# Patient Record
Sex: FEMALE | Race: WHITE | NOT HISPANIC OR LATINO | Employment: UNEMPLOYED | URBAN - METROPOLITAN AREA
[De-identification: names, ages, dates, MRNs, and addresses within clinical notes are randomized per-mention and may not be internally consistent; named-entity substitution may affect disease eponyms.]

---

## 2021-01-14 DIAGNOSIS — Z23 NEED FOR VACCINATION: ICD-10-CM

## 2022-12-22 ENCOUNTER — OFFICE VISIT (OUTPATIENT)
Dept: MEDICAL GROUP | Facility: MEDICAL CENTER | Age: 77
End: 2022-12-22
Payer: MEDICARE

## 2022-12-22 VITALS
OXYGEN SATURATION: 93 % | SYSTOLIC BLOOD PRESSURE: 116 MMHG | TEMPERATURE: 96.9 F | DIASTOLIC BLOOD PRESSURE: 68 MMHG | HEART RATE: 88 BPM

## 2022-12-22 DIAGNOSIS — F17.200 CURRENT SMOKER: ICD-10-CM

## 2022-12-22 DIAGNOSIS — Z23 NEED FOR VACCINATION: ICD-10-CM

## 2022-12-22 DIAGNOSIS — F33.9 MAJOR DEPRESSION, RECURRENT, CHRONIC (HCC): ICD-10-CM

## 2022-12-22 PROBLEM — M85.80 OSTEOPENIA: Status: ACTIVE | Noted: 2022-10-15

## 2022-12-22 PROCEDURE — G0008 ADMIN INFLUENZA VIRUS VAC: HCPCS | Performed by: FAMILY MEDICINE

## 2022-12-22 PROCEDURE — 90662 IIV NO PRSV INCREASED AG IM: CPT | Performed by: FAMILY MEDICINE

## 2022-12-22 PROCEDURE — 99204 OFFICE O/P NEW MOD 45 MIN: CPT | Mod: 25 | Performed by: FAMILY MEDICINE

## 2022-12-22 RX ORDER — ZIPRASIDONE HYDROCHLORIDE 60 MG/1
CAPSULE ORAL
COMMUNITY
Start: 2022-09-28 | End: 2022-12-22

## 2022-12-22 RX ORDER — SERTRALINE HYDROCHLORIDE 25 MG/1
TABLET, FILM COATED ORAL
Qty: 42 TABLET | Refills: 0 | Status: SHIPPED | OUTPATIENT
Start: 2022-12-22 | End: 2022-12-22

## 2022-12-22 RX ORDER — SERTRALINE HYDROCHLORIDE 25 MG/1
TABLET, FILM COATED ORAL
Qty: 42 TABLET | Refills: 0 | Status: SHIPPED | OUTPATIENT
Start: 2022-12-22 | End: 2023-01-19

## 2022-12-22 ASSESSMENT — ENCOUNTER SYMPTOMS
FEVER: 0
ABDOMINAL PAIN: 0
MYALGIAS: 0
COUGH: 0
SHORTNESS OF BREATH: 0
DIZZINESS: 0
PALPITATIONS: 0
WEIGHT LOSS: 0
DEPRESSION: 0
WEAKNESS: 0
CHILLS: 0

## 2022-12-22 ASSESSMENT — PATIENT HEALTH QUESTIONNAIRE - PHQ9
5. POOR APPETITE OR OVEREATING: 0 - NOT AT ALL
SUM OF ALL RESPONSES TO PHQ QUESTIONS 1-9: 5
CLINICAL INTERPRETATION OF PHQ2 SCORE: 3

## 2022-12-22 NOTE — ASSESSMENT & PLAN NOTE
Chronic, unstable.  During the exam patient seemed very subdued, and minimal talking.  Discussed having her off the Geodon as this would not be a first choice medication for depression.  Consider getting her started on Zoloft and replacement of the acute on.  Discussed the importance of slow tapering off of the gait on we will slowly increasing the Zoloft.  Reduced Geodon down to 60 mg daily for the next 4 weeks, initiate Zoloft 25 mg daily for 2 weeks and increase to 50 mg for an additional 2 weeks.  Patient is to follow-up in 4 weeks to see how reduction of the Geodon and increasing the Zoloft is going for her.

## 2022-12-22 NOTE — PROGRESS NOTES
Jess Diallo is a pleasant 77 y.o. female here to establish care.    HPI:   Problem   Osteopenia   Major Depression, Recurrent, Chronic (Hcc)    She presents today with her caregiver who is her historian.  She is currently taking Geodon 120 mg daily for her depression.  Caregiver reports that patient is generally groggy all day, sleeps a lot while on this medication.  Unsure of who prescribed the medication or why it was prescribed.  She is unsure of exactly how long she has been on this medication.  Patient does not talk a lot and is unsure if this is related to the medication itself.     Current Smoker    Caregiver reports that the patient is a some day smoker, has not been smoking for a long period of time.  She has asked the neighbors who report that she some days will not smoke at all and other days she will smoke a few cigarettes.            Current medicines (including changes today)  Current Outpatient Medications   Medication Sig Dispense Refill    Multiple Vitamin (MULTI-VITAMIN DAILY PO) Take  by mouth.      sertraline (ZOLOFT) 25 MG tablet Take 1 Tablet by mouth every day for 14 days, THEN 2 Tablets every day for 14 days. 42 Tablet 0     No current facility-administered medications for this visit.       Past Medical/ Surgical History  She  has a past medical history of Bipolar 1 disorder (HCC).  She  has a past surgical history that includes other.    Social History  Social History     Tobacco Use    Smoking status: Some Days     Years: 15.00     Types: Cigarettes   Vaping Use    Vaping Use: Never used   Substance Use Topics    Alcohol use: No    Drug use: No     Social History     Social History Narrative    Not on file        Family History  History reviewed. No pertinent family history.  No family status information on file.         Review of Systems   Constitutional:  Negative for chills, fever, malaise/fatigue and weight loss.   Respiratory:  Negative for cough and shortness of breath.     Cardiovascular:  Negative for chest pain and palpitations.   Gastrointestinal:  Negative for abdominal pain.   Genitourinary: Negative.    Musculoskeletal:  Negative for myalgias.   Skin:  Negative for rash.   Neurological:  Negative for dizziness and weakness.   Psychiatric/Behavioral:  Negative for depression.        Objective:     /68   Pulse 88   Temp 36.1 °C (96.9 °F) (Temporal)   SpO2 93%  There is no height or weight on file to calculate BMI.    Physical Exam  Constitutional:       General: She is not in acute distress.     Appearance: She is not ill-appearing or toxic-appearing.   HENT:      Head: Normocephalic.      Right Ear: Tympanic membrane and external ear normal.      Left Ear: Tympanic membrane and external ear normal.      Nose: Nose normal. No rhinorrhea.      Mouth/Throat:      Mouth: Mucous membranes are moist.      Pharynx: Oropharynx is clear. No posterior oropharyngeal erythema.   Eyes:      General:         Right eye: No discharge.         Left eye: No discharge.      Conjunctiva/sclera: Conjunctivae normal.      Pupils: Pupils are equal, round, and reactive to light.   Cardiovascular:      Rate and Rhythm: Normal rate and regular rhythm.      Heart sounds: No murmur heard.  Pulmonary:      Effort: Pulmonary effort is normal. No respiratory distress.      Breath sounds: Normal breath sounds. No wheezing.   Abdominal:      General: Abdomen is flat. Bowel sounds are normal.      Palpations: Abdomen is soft.      Tenderness: There is no abdominal tenderness.   Musculoskeletal:         General: No swelling or tenderness.      Cervical back: Normal range of motion and neck supple.      Right lower leg: No edema.      Left lower leg: No edema.   Skin:     General: Skin is warm.   Neurological:      General: No focal deficit present.      Mental Status: She is alert and oriented to person, place, and time. Mental status is at baseline.   Psychiatric:         Mood and Affect: Mood normal.         Imaging:  No imaging    Labs:  No labs  Assessment and Plan:   The following treatment plan was discussed     Problem List Items Addressed This Visit       Major depression, recurrent, chronic (HCC)     Chronic, unstable.  During the exam patient seemed very subdued, and minimal talking.  Discussed having her off the Geodon as this would not be a first choice medication for depression.  Consider getting her started on Zoloft and replacement of the acute on.  Discussed the importance of slow tapering off of the gait on we will slowly increasing the Zoloft.  Reduced Geodon down to 60 mg daily for the next 4 weeks, initiate Zoloft 25 mg daily for 2 weeks and increase to 50 mg for an additional 2 weeks.  Patient is to follow-up in 4 weeks to see how reduction of the Geodon and increasing the Zoloft is going for her.         Relevant Medications    sertraline (ZOLOFT) 25 MG tablet    Current smoker     Chronic, stable.  Discussed with caregiver about the importance of smoking cessation altogether and how stopping smoking may benefit the patient greatly.          Other Visit Diagnoses       Need for vaccination        Relevant Orders    INFLUENZA VACCINE, HIGH DOSE (65+ ONLY)             Records requested.  Followup: Return in about 4 weeks (around 1/19/2023), or if symptoms worsen or fail to improve, for follow-up on medications.    I have placed vaccination orders.  The MA is preforming vaccination orders under the direction of Dr. Arreola    Please note that this dictation was created using voice recognition software. I have made every reasonable attempt to correct obvious errors, but I expect that there are errors of grammar and possibly content that I did not discover before finalizing the note.

## 2022-12-22 NOTE — ASSESSMENT & PLAN NOTE
Chronic, stable.  Discussed with caregiver about the importance of smoking cessation altogether and how stopping smoking may benefit the patient greatly.

## 2022-12-22 NOTE — PATIENT INSTRUCTIONS
Geodon 60 mg once every evening and start on the zoloft 25 mg once daily for 2 weeks, increase to 50 mg for 2 weeks

## 2023-01-24 ENCOUNTER — OFFICE VISIT (OUTPATIENT)
Dept: MEDICAL GROUP | Facility: MEDICAL CENTER | Age: 78
End: 2023-01-24
Payer: MEDICARE

## 2023-01-24 VITALS
BODY MASS INDEX: 29.18 KG/M2 | TEMPERATURE: 97.3 F | HEIGHT: 69 IN | OXYGEN SATURATION: 94 % | WEIGHT: 197 LBS | SYSTOLIC BLOOD PRESSURE: 104 MMHG | HEART RATE: 94 BPM | DIASTOLIC BLOOD PRESSURE: 64 MMHG | RESPIRATION RATE: 16 BRPM

## 2023-01-24 DIAGNOSIS — M85.80 OSTEOPENIA, UNSPECIFIED LOCATION: ICD-10-CM

## 2023-01-24 DIAGNOSIS — Z13.220 ENCOUNTER FOR LIPID SCREENING FOR CARDIOVASCULAR DISEASE: ICD-10-CM

## 2023-01-24 DIAGNOSIS — F33.9 MAJOR DEPRESSION, RECURRENT, CHRONIC (HCC): ICD-10-CM

## 2023-01-24 DIAGNOSIS — Z11.59 ENCOUNTER FOR HEPATITIS C SCREENING TEST FOR LOW RISK PATIENT: ICD-10-CM

## 2023-01-24 DIAGNOSIS — I73.9 PVD (PERIPHERAL VASCULAR DISEASE) (HCC): ICD-10-CM

## 2023-01-24 DIAGNOSIS — Z13.1 SCREENING FOR DIABETES MELLITUS: ICD-10-CM

## 2023-01-24 DIAGNOSIS — Z13.6 ENCOUNTER FOR LIPID SCREENING FOR CARDIOVASCULAR DISEASE: ICD-10-CM

## 2023-01-24 DIAGNOSIS — Z13.0 SCREENING FOR DEFICIENCY ANEMIA: ICD-10-CM

## 2023-01-24 PROBLEM — M79.89 SWELLING OF LOWER EXTREMITY: Status: RESOLVED | Noted: 2023-01-24 | Resolved: 2023-01-24

## 2023-01-24 PROBLEM — M79.89 SWELLING OF LOWER EXTREMITY: Status: ACTIVE | Noted: 2023-01-24

## 2023-01-24 PROCEDURE — 99214 OFFICE O/P EST MOD 30 MIN: CPT | Performed by: FAMILY MEDICINE

## 2023-01-24 RX ORDER — ZIPRASIDONE HYDROCHLORIDE 40 MG/1
40 CAPSULE ORAL EVERY EVENING
Qty: 30 CAPSULE | Refills: 0 | Status: SHIPPED | OUTPATIENT
Start: 2023-01-24 | End: 2023-02-23

## 2023-01-24 RX ORDER — HYDROCORTISONE ACETATE 0.5 %
300 CREAM (GRAM) TOPICAL DAILY
Qty: 30 CAPSULE | Refills: 0 | Status: SHIPPED | OUTPATIENT
Start: 2023-01-24 | End: 2023-02-23

## 2023-01-24 RX ORDER — SERTRALINE HYDROCHLORIDE 25 MG/1
25 TABLET, FILM COATED ORAL DAILY
Qty: 30 TABLET | Refills: 11 | Status: SHIPPED
Start: 2023-01-24 | End: 2023-01-24

## 2023-01-24 RX ORDER — ZIPRASIDONE HYDROCHLORIDE 20 MG/1
20 CAPSULE ORAL EVERY EVENING
Qty: 30 CAPSULE | Refills: 0 | Status: SHIPPED
Start: 2023-02-23 | End: 2023-03-03

## 2023-01-24 NOTE — ASSESSMENT & PLAN NOTE
Chronic, Stable.  Continue to reduce her Geodon down to 40 mg daily x4 weeks, then down to 20 mg every evening.  Advised to start Zoloft 25 mg daily.  Will increase dose pending her improved mood.  Discussed [facility] to help with medication.  Advise to utilize [employer] to keep communication.

## 2023-01-24 NOTE — PROGRESS NOTES
Jess Diallo is a pleasant 77 y.o. female here for follow-up on medications     HPI:   Problem   Pvd (Peripheral Vascular Disease) (Hcc)    Bilateral lower extremity swelling, discoloration to lower extremities, worse during the wintertime.  Currently not engaging any cardiovascular exercise.  Has not had labs checked in some time.  Positive history for smoking     Osteopenia    Completed DEXA scan in 2021 which showed some osteopenia to the left hip.  Currently taking a daily multivitamin.  Has not had labs checked in some time.     Major Depression, Recurrent, Chronic (Hcc)    She presents today with her caregiver who is her historian.  She was taking Geodon 120 mg daily for her depression, now taking 60 mg every evening  With reduction of her dose, care giver reports improvement in her day time sleepiness.  Caregiver has noted patient is more alert but continues to be very observant and not talk often.  Hasn't started on her new medication, Zoloft 25 mg.  Has not been able to  this medication yet.  Patient declines any depressive symptoms, caregiver does note some lower mood as the patient has been having to take more care of herself versus caregiver caring for her.    Care giver reports difficulty with cost of visits for the patient and medication cost.     Swelling of Lower Extremity (Resolved)        Current Medicines (including changes today)  Current Outpatient Medications   Medication Sig Dispense Refill    ziprasidone (GEODON) 40 MG Cap Take 1 Capsule by mouth every evening for 30 days. 30 Capsule 0    [START ON 2/23/2023] ziprasidone (GEODON) 20 MG Cap Take 1 Capsule by mouth every evening for 30 days. 30 Capsule 0    Horse [place] 300 MG Cap Take 1 Capsule by mouth every day for 30 days. 30 Capsule 0    Multiple Vitamin (MULTI-VITAMIN DAILY PO) Take  by mouth.       No current facility-administered medications for this visit.     Past Medical/ Surgical History  She  has a past medical history of  "Bipolar 1 disorder (HCC).  She  has a past surgical history that includes other.     Objective:     /64 (BP Location: Left arm, Patient Position: Sitting, BP Cuff Size: Adult)   Pulse 94   Temp 36.3 °C (97.3 °F) (Temporal)   Resp 16   Ht 1.753 m (5' 9\")   Wt 89.4 kg (197 lb)   SpO2 94%  Body mass index is 29.09 kg/m².    Physical Exam  Constitutional:       General: She is not in acute distress.  HENT:      Head: Normocephalic and atraumatic.   Eyes:      Conjunctiva/sclera: Conjunctivae normal.      Pupils: Pupils are equal, round, and reactive to light.   Pulmonary:      Effort: Pulmonary effort is normal. No respiratory distress.   Abdominal:      General: There is no distension.   Musculoskeletal:      Cervical back: Normal range of motion and neck supple.   Skin:     General: Skin is warm and dry.      Findings: No rash.   Neurological:      Mental Status: She is alert and oriented to person, place, and time.      Gait: Gait is intact.   Psychiatric:         Mood and Affect: Affect normal.        Imagin2022 DEXA Bone:   Evaluation of the LUMBAR SPINE from L1 through L4 demonstrates good image quality.   Lowest bone mineral density (BMD) of L1-L4 = 0.959 gm/cm2.   T-score is -1.4 standard deviations.   Z-score is -0.5 standard deviations.     Evaluation of the LEFT HIP demonstrates good image quality.   Lowest bone mineral density (BMD) of left hip = 0.718 gm/cm2.   T-score is -2.3 standard deviations.   Z-score is -0.9 standard deviations.     FRAX 10 YEAR PROBABILITY OF FRACTURE.   Major osteoporotic fracture 17.3%.   Hip fracture 7.6%.     Labs  No recent labs  Assessment and Plan:   The following treatment plan was discussed     Problem List Items Addressed This Visit       Osteopenia     Chronic, stable.  Increase in weightbearing exercises.  Will check vitamin D levels.         Relevant Orders    VITAMIN D,25 HYDROXY (DEFICIENCY)    Major depression, recurrent, chronic (HCC)     " Chronic, Stable.  Continue to reduce her Geodon down to 40 mg daily x4 weeks, then down to 20 mg every evening.  Advised to start Zoloft 25 mg daily.  Will increase dose pending her improved mood.  Discussed [employer] to help with medication.  Advise to utilize [employer] to keep communication.         Relevant Medications    ziprasidone (GEODON) 40 MG Cap    ziprasidone (GEODON) 20 MG Cap (Start on 2023)    PVD (peripheral vascular disease) (HCC)     New diagnosis for the patient.  Upon physical exam bilateral lower extremity swelling with right side slightly more swollen than the left.  Positive discoloration noted to bilateral lower extremities.  Suspect peripheral vascular disease.  Recommend horse [place] extract, cardiovascular exercise.  Utilize compression stockings and avoid heavy salt diet.         Relevant Medications    Horse [place] 300 MG Cap    Other Relevant Orders    Lipid Profile     Other Visit Diagnoses       Encounter for lipid screening for cardiovascular disease        Relevant Orders    Lipid Profile    Screening for diabetes mellitus        Relevant Orders    Comp Metabolic Panel    ESTIMATED GFR    Screening for deficiency anemia        Relevant Orders    CBC WITH DIFFERENTIAL    Encounter for hepatitis C screening test for low risk patient        Relevant Orders    HCV Scrn ( [id number] [employer])             Followup: Return in about 4 weeks (around 2023), or if symptoms worsen or fail to improve, for Annual wellness.      Please note that this dictation was created using voice recognition software. I have made every reasonable attempt to correct obvious errors, but I expect that there are errors of grammar and possibly content that I did not discover before finalizing the note.

## 2023-01-24 NOTE — ASSESSMENT & PLAN NOTE
New diagnosis for the patient.  Upon physical exam bilateral lower extremity swelling with right side slightly more swollen than the left.  Positive discoloration noted to bilateral lower extremities.  Suspect peripheral vascular disease.  Recommend horse [place] extract, cardiovascular exercise.  Utilize compression stockings and avoid heavy salt diet.

## 2023-03-03 ENCOUNTER — APPOINTMENT (OUTPATIENT)
Dept: RADIOLOGY | Facility: MEDICAL CENTER | Age: 78
DRG: 843 | End: 2023-03-03
Attending: EMERGENCY MEDICINE
Payer: MEDICARE

## 2023-03-03 ENCOUNTER — HOSPITAL ENCOUNTER (INPATIENT)
Facility: MEDICAL CENTER | Age: 78
LOS: 6 days | DRG: 843 | End: 2023-03-09
Attending: EMERGENCY MEDICINE | Admitting: HOSPITALIST
Payer: MEDICARE

## 2023-03-03 ENCOUNTER — APPOINTMENT (OUTPATIENT)
Dept: URGENT CARE | Facility: CLINIC | Age: 78
End: 2023-03-03
Payer: MEDICARE

## 2023-03-03 DIAGNOSIS — R41.89 COGNITIVE CHANGE: ICD-10-CM

## 2023-03-03 DIAGNOSIS — M79.89 LEG SWELLING: ICD-10-CM

## 2023-03-03 DIAGNOSIS — R06.02 SHORTNESS OF BREATH: ICD-10-CM

## 2023-03-03 DIAGNOSIS — R00.0 TACHYCARDIA: ICD-10-CM

## 2023-03-03 DIAGNOSIS — I26.99 ACUTE PULMONARY EMBOLISM, UNSPECIFIED PULMONARY EMBOLISM TYPE, UNSPECIFIED WHETHER ACUTE COR PULMONALE PRESENT (HCC): ICD-10-CM

## 2023-03-03 DIAGNOSIS — I50.9 ACUTE CONGESTIVE HEART FAILURE, UNSPECIFIED HEART FAILURE TYPE (HCC): ICD-10-CM

## 2023-03-03 DIAGNOSIS — C79.9 METASTATIC MALIGNANT NEOPLASM, UNSPECIFIED SITE (HCC): ICD-10-CM

## 2023-03-03 DIAGNOSIS — R41.0 CONFUSION: ICD-10-CM

## 2023-03-03 DIAGNOSIS — R65.10 SIRS (SYSTEMIC INFLAMMATORY RESPONSE SYNDROME) (HCC): ICD-10-CM

## 2023-03-03 DIAGNOSIS — R09.02 HYPOXIA: ICD-10-CM

## 2023-03-03 PROBLEM — G93.40 ENCEPHALOPATHY ACUTE: Status: ACTIVE | Noted: 2023-03-03

## 2023-03-03 PROBLEM — J96.01 ACUTE HYPOXEMIC RESPIRATORY FAILURE (HCC): Status: ACTIVE | Noted: 2023-03-03

## 2023-03-03 PROBLEM — E87.1 HYPONATREMIA: Status: ACTIVE | Noted: 2023-03-03

## 2023-03-03 PROBLEM — R74.8 ELEVATED LIVER ENZYMES: Status: ACTIVE | Noted: 2023-03-03

## 2023-03-03 PROBLEM — I26.92 ACUTE SADDLE PULMONARY EMBOLISM (HCC): Status: ACTIVE | Noted: 2023-03-03

## 2023-03-03 LAB
ALBUMIN SERPL BCP-MCNC: 2.8 G/DL (ref 3.2–4.9)
ALBUMIN/GLOB SERPL: 0.7 G/DL
ALP SERPL-CCNC: 189 U/L (ref 30–99)
ALT SERPL-CCNC: 24 U/L (ref 2–50)
ANION GAP SERPL CALC-SCNC: 13 MMOL/L (ref 7–16)
APPEARANCE UR: CLEAR
APTT PPP: 33.7 SEC (ref 24.7–36)
AST SERPL-CCNC: 58 U/L (ref 12–45)
BASOPHILS # BLD AUTO: 0.2 % (ref 0–1.8)
BASOPHILS # BLD: 0.03 K/UL (ref 0–0.12)
BILIRUB SERPL-MCNC: 0.8 MG/DL (ref 0.1–1.5)
BILIRUB UR QL STRIP.AUTO: NEGATIVE
BUN SERPL-MCNC: 13 MG/DL (ref 8–22)
CALCIUM ALBUM COR SERPL-MCNC: 9.1 MG/DL (ref 8.5–10.5)
CALCIUM SERPL-MCNC: 8.1 MG/DL (ref 8.4–10.2)
CHLORIDE SERPL-SCNC: 96 MMOL/L (ref 96–112)
CO2 SERPL-SCNC: 20 MMOL/L (ref 20–33)
COLOR UR: YELLOW
CREAT SERPL-MCNC: 0.78 MG/DL (ref 0.5–1.4)
EKG IMPRESSION: NORMAL
EOSINOPHIL # BLD AUTO: 0.02 K/UL (ref 0–0.51)
EOSINOPHIL NFR BLD: 0.2 % (ref 0–6.9)
ERYTHROCYTE [DISTWIDTH] IN BLOOD BY AUTOMATED COUNT: 47.2 FL (ref 35.9–50)
FLUAV RNA SPEC QL NAA+PROBE: NEGATIVE
FLUBV RNA SPEC QL NAA+PROBE: NEGATIVE
GFR SERPLBLD CREATININE-BSD FMLA CKD-EPI: 78 ML/MIN/1.73 M 2
GLOBULIN SER CALC-MCNC: 3.9 G/DL (ref 1.9–3.5)
GLUCOSE SERPL-MCNC: 133 MG/DL (ref 65–99)
GLUCOSE UR STRIP.AUTO-MCNC: NEGATIVE MG/DL
HCT VFR BLD AUTO: 37.8 % (ref 37–47)
HGB BLD-MCNC: 11.8 G/DL (ref 12–16)
IMM GRANULOCYTES # BLD AUTO: 0.05 K/UL (ref 0–0.11)
IMM GRANULOCYTES NFR BLD AUTO: 0.4 % (ref 0–0.9)
INR PPP: 1.24 (ref 0.87–1.13)
KETONES UR STRIP.AUTO-MCNC: NEGATIVE MG/DL
LACTATE SERPL-SCNC: 1.8 MMOL/L (ref 0.5–2)
LEUKOCYTE ESTERASE UR QL STRIP.AUTO: NEGATIVE
LYMPHOCYTES # BLD AUTO: 0.64 K/UL (ref 1–4.8)
LYMPHOCYTES NFR BLD: 5.3 % (ref 22–41)
MCH RBC QN AUTO: 26.2 PG (ref 27–33)
MCHC RBC AUTO-ENTMCNC: 31.2 G/DL (ref 33.6–35)
MCV RBC AUTO: 83.8 FL (ref 81.4–97.8)
MICRO URNS: NORMAL
MONOCYTES # BLD AUTO: 1.03 K/UL (ref 0–0.85)
MONOCYTES NFR BLD AUTO: 8.5 % (ref 0–13.4)
NEUTROPHILS # BLD AUTO: 10.31 K/UL (ref 2–7.15)
NEUTROPHILS NFR BLD: 85.4 % (ref 44–72)
NITRITE UR QL STRIP.AUTO: NEGATIVE
NRBC # BLD AUTO: 0 K/UL
NRBC BLD-RTO: 0 /100 WBC
NT-PROBNP SERPL IA-MCNC: 326 PG/ML (ref 0–125)
PH UR STRIP.AUTO: 5.5 [PH] (ref 5–8)
PLATELET # BLD AUTO: 323 K/UL (ref 164–446)
PMV BLD AUTO: 10.1 FL (ref 9–12.9)
POTASSIUM SERPL-SCNC: 4.4 MMOL/L (ref 3.6–5.5)
PROT SERPL-MCNC: 6.7 G/DL (ref 6–8.2)
PROT UR QL STRIP: NEGATIVE MG/DL
PROTHROMBIN TIME: 15.4 SEC (ref 12–14.6)
RBC # BLD AUTO: 4.51 M/UL (ref 4.2–5.4)
RBC UR QL AUTO: NEGATIVE
RSV RNA SPEC QL NAA+PROBE: NEGATIVE
SARS-COV-2 RNA RESP QL NAA+PROBE: NOTDETECTED
SODIUM SERPL-SCNC: 129 MMOL/L (ref 135–145)
SP GR UR STRIP.AUTO: 1.01
SPECIMEN SOURCE: NORMAL
TROPONIN T SERPL-MCNC: 13 NG/L (ref 6–19)
UFH PPP CHRO-ACNC: <0.1 IU/ML
WBC # BLD AUTO: 12.1 K/UL (ref 4.8–10.8)

## 2023-03-03 PROCEDURE — 83605 ASSAY OF LACTIC ACID: CPT

## 2023-03-03 PROCEDURE — 99285 EMERGENCY DEPT VISIT HI MDM: CPT

## 2023-03-03 PROCEDURE — 700111 HCHG RX REV CODE 636 W/ 250 OVERRIDE (IP): Performed by: EMERGENCY MEDICINE

## 2023-03-03 PROCEDURE — 84484 ASSAY OF TROPONIN QUANT: CPT

## 2023-03-03 PROCEDURE — 700117 HCHG RX CONTRAST REV CODE 255: Performed by: EMERGENCY MEDICINE

## 2023-03-03 PROCEDURE — 0241U HCHG SARS-COV-2 COVID-19 NFCT DS RESP RNA 4 TRGT MIC: CPT

## 2023-03-03 PROCEDURE — 83880 ASSAY OF NATRIURETIC PEPTIDE: CPT

## 2023-03-03 PROCEDURE — 94640 AIRWAY INHALATION TREATMENT: CPT

## 2023-03-03 PROCEDURE — C9803 HOPD COVID-19 SPEC COLLECT: HCPCS | Performed by: EMERGENCY MEDICINE

## 2023-03-03 PROCEDURE — 70450 CT HEAD/BRAIN W/O DYE: CPT

## 2023-03-03 PROCEDURE — 0240U HCHG SARS-COV-2 COVID-19 NFCT DS RESP RNA 3 TRGT MIC: CPT

## 2023-03-03 PROCEDURE — 36415 COLL VENOUS BLD VENIPUNCTURE: CPT

## 2023-03-03 PROCEDURE — 700111 HCHG RX REV CODE 636 W/ 250 OVERRIDE (IP): Performed by: HOSPITALIST

## 2023-03-03 PROCEDURE — 99223 1ST HOSP IP/OBS HIGH 75: CPT | Mod: AI | Performed by: HOSPITALIST

## 2023-03-03 PROCEDURE — 700101 HCHG RX REV CODE 250: Performed by: EMERGENCY MEDICINE

## 2023-03-03 PROCEDURE — 93005 ELECTROCARDIOGRAM TRACING: CPT | Performed by: EMERGENCY MEDICINE

## 2023-03-03 PROCEDURE — 96375 TX/PRO/DX INJ NEW DRUG ADDON: CPT

## 2023-03-03 PROCEDURE — 96365 THER/PROPH/DIAG IV INF INIT: CPT

## 2023-03-03 PROCEDURE — 87040 BLOOD CULTURE FOR BACTERIA: CPT

## 2023-03-03 PROCEDURE — 85730 THROMBOPLASTIN TIME PARTIAL: CPT

## 2023-03-03 PROCEDURE — 74177 CT ABD & PELVIS W/CONTRAST: CPT

## 2023-03-03 PROCEDURE — 71275 CT ANGIOGRAPHY CHEST: CPT

## 2023-03-03 PROCEDURE — 85610 PROTHROMBIN TIME: CPT

## 2023-03-03 PROCEDURE — 700105 HCHG RX REV CODE 258: Performed by: HOSPITALIST

## 2023-03-03 PROCEDURE — 85520 HEPARIN ASSAY: CPT

## 2023-03-03 PROCEDURE — 87086 URINE CULTURE/COLONY COUNT: CPT

## 2023-03-03 PROCEDURE — 94760 N-INVAS EAR/PLS OXIMETRY 1: CPT

## 2023-03-03 PROCEDURE — 93005 ELECTROCARDIOGRAM TRACING: CPT

## 2023-03-03 PROCEDURE — 71045 X-RAY EXAM CHEST 1 VIEW: CPT

## 2023-03-03 PROCEDURE — 80053 COMPREHEN METABOLIC PANEL: CPT

## 2023-03-03 PROCEDURE — 770020 HCHG ROOM/CARE - TELE (206)

## 2023-03-03 PROCEDURE — 700105 HCHG RX REV CODE 258: Performed by: EMERGENCY MEDICINE

## 2023-03-03 PROCEDURE — 81003 URINALYSIS AUTO W/O SCOPE: CPT

## 2023-03-03 PROCEDURE — 85025 COMPLETE CBC W/AUTO DIFF WBC: CPT

## 2023-03-03 RX ORDER — FUROSEMIDE 10 MG/ML
80 INJECTION INTRAMUSCULAR; INTRAVENOUS ONCE
Status: DISCONTINUED | OUTPATIENT
Start: 2023-03-03 | End: 2023-03-03

## 2023-03-03 RX ORDER — SODIUM CHLORIDE 9 MG/ML
1000 INJECTION, SOLUTION INTRAVENOUS CONTINUOUS
Status: DISCONTINUED | OUTPATIENT
Start: 2023-03-03 | End: 2023-03-05

## 2023-03-03 RX ORDER — HEPARIN SODIUM 1000 [USP'U]/ML
40 INJECTION, SOLUTION INTRAVENOUS; SUBCUTANEOUS PRN
Status: DISCONTINUED | OUTPATIENT
Start: 2023-03-03 | End: 2023-03-05

## 2023-03-03 RX ORDER — SODIUM CHLORIDE, SODIUM LACTATE, POTASSIUM CHLORIDE, AND CALCIUM CHLORIDE .6; .31; .03; .02 G/100ML; G/100ML; G/100ML; G/100ML
500 INJECTION, SOLUTION INTRAVENOUS
Status: DISCONTINUED | OUTPATIENT
Start: 2023-03-03 | End: 2023-03-09 | Stop reason: HOSPADM

## 2023-03-03 RX ORDER — SODIUM CHLORIDE, SODIUM LACTATE, POTASSIUM CHLORIDE, AND CALCIUM CHLORIDE .6; .31; .03; .02 G/100ML; G/100ML; G/100ML; G/100ML
30 INJECTION, SOLUTION INTRAVENOUS
Status: DISCONTINUED | OUTPATIENT
Start: 2023-03-03 | End: 2023-03-09 | Stop reason: HOSPADM

## 2023-03-03 RX ORDER — IPRATROPIUM BROMIDE AND ALBUTEROL SULFATE 2.5; .5 MG/3ML; MG/3ML
3 SOLUTION RESPIRATORY (INHALATION) ONCE
Status: COMPLETED | OUTPATIENT
Start: 2023-03-03 | End: 2023-03-03

## 2023-03-03 RX ORDER — BISACODYL 10 MG
10 SUPPOSITORY, RECTAL RECTAL
Status: DISCONTINUED | OUTPATIENT
Start: 2023-03-03 | End: 2023-03-09 | Stop reason: HOSPADM

## 2023-03-03 RX ORDER — HEPARIN SODIUM 5000 [USP'U]/100ML
0-30 INJECTION, SOLUTION INTRAVENOUS CONTINUOUS
Status: DISCONTINUED | OUTPATIENT
Start: 2023-03-03 | End: 2023-03-05

## 2023-03-03 RX ORDER — SODIUM CHLORIDE 9 MG/ML
1000 INJECTION, SOLUTION INTRAVENOUS ONCE
Status: COMPLETED | OUTPATIENT
Start: 2023-03-03 | End: 2023-03-03

## 2023-03-03 RX ORDER — ACETAMINOPHEN 325 MG/1
650 TABLET ORAL EVERY 6 HOURS PRN
Status: DISCONTINUED | OUTPATIENT
Start: 2023-03-03 | End: 2023-03-09 | Stop reason: HOSPADM

## 2023-03-03 RX ORDER — AMOXICILLIN 250 MG
2 CAPSULE ORAL 2 TIMES DAILY
Status: DISCONTINUED | OUTPATIENT
Start: 2023-03-03 | End: 2023-03-09 | Stop reason: HOSPADM

## 2023-03-03 RX ORDER — POLYETHYLENE GLYCOL 3350 17 G/17G
1 POWDER, FOR SOLUTION ORAL
Status: DISCONTINUED | OUTPATIENT
Start: 2023-03-03 | End: 2023-03-09 | Stop reason: HOSPADM

## 2023-03-03 RX ORDER — HEPARIN SODIUM 1000 [USP'U]/ML
80 INJECTION, SOLUTION INTRAVENOUS; SUBCUTANEOUS ONCE
Status: COMPLETED | OUTPATIENT
Start: 2023-03-03 | End: 2023-03-03

## 2023-03-03 RX ORDER — CEFTRIAXONE 2 G/1
2000 INJECTION, POWDER, FOR SOLUTION INTRAMUSCULAR; INTRAVENOUS ONCE
Status: COMPLETED | OUTPATIENT
Start: 2023-03-03 | End: 2023-03-03

## 2023-03-03 RX ADMIN — SODIUM CHLORIDE 1000 ML: 9 INJECTION, SOLUTION INTRAVENOUS at 23:41

## 2023-03-03 RX ADMIN — IOHEXOL 100 ML: 350 INJECTION, SOLUTION INTRAVENOUS at 18:22

## 2023-03-03 RX ADMIN — SODIUM CHLORIDE 1000 ML: 9 INJECTION, SOLUTION INTRAVENOUS at 18:33

## 2023-03-03 RX ADMIN — CEFTRIAXONE SODIUM 2000 MG: 2 INJECTION, POWDER, FOR SOLUTION INTRAMUSCULAR; INTRAVENOUS at 18:33

## 2023-03-03 RX ADMIN — HEPARIN SODIUM 6000 UNITS: 1000 INJECTION, SOLUTION INTRAVENOUS; SUBCUTANEOUS at 21:49

## 2023-03-03 RX ADMIN — HEPARIN SODIUM 18 UNITS/KG/HR: 5000 INJECTION, SOLUTION INTRAVENOUS at 21:56

## 2023-03-03 RX ADMIN — IPRATROPIUM BROMIDE AND ALBUTEROL SULFATE 3 ML: .5; 2.5 SOLUTION RESPIRATORY (INHALATION) at 18:40

## 2023-03-03 RX ADMIN — SODIUM CHLORIDE 1000 ML: 9 INJECTION, SOLUTION INTRAVENOUS at 22:39

## 2023-03-03 RX ADMIN — IOHEXOL 100 ML: 350 INJECTION, SOLUTION INTRAVENOUS at 19:27

## 2023-03-03 ASSESSMENT — PAIN DESCRIPTION - PAIN TYPE: TYPE: ACUTE PAIN

## 2023-03-03 ASSESSMENT — ENCOUNTER SYMPTOMS
ABDOMINAL PAIN: 0
CHILLS: 0
COUGH: 0
BRUISES/BLEEDS EASILY: 0
VOMITING: 0
FLANK PAIN: 0
EYE DISCHARGE: 0
EYE REDNESS: 0
MYALGIAS: 0
FEVER: 0
SHORTNESS OF BREATH: 1
STRIDOR: 0
FOCAL WEAKNESS: 0
NERVOUS/ANXIOUS: 0

## 2023-03-03 ASSESSMENT — COPD QUESTIONNAIRES
HAVE YOU SMOKED AT LEAST 100 CIGARETTES IN YOUR ENTIRE LIFE: YES
DURING THE PAST 4 WEEKS HOW MUCH DID YOU FEEL SHORT OF BREATH: SOME OF THE TIME
DO YOU EVER COUGH UP ANY MUCUS OR PHLEGM?: NO/ONLY WITH OCCASIONAL COLDS OR INFECTIONS
COPD SCREENING SCORE: 6

## 2023-03-04 ENCOUNTER — APPOINTMENT (OUTPATIENT)
Dept: CARDIOLOGY | Facility: MEDICAL CENTER | Age: 78
DRG: 843 | End: 2023-03-04
Attending: FAMILY MEDICINE
Payer: MEDICARE

## 2023-03-04 PROBLEM — F20.0 PARANOID SCHIZOPHRENIA (HCC): Status: ACTIVE | Noted: 2023-03-04

## 2023-03-04 PROBLEM — D64.9 NORMOCYTIC ANEMIA: Status: ACTIVE | Noted: 2023-03-04

## 2023-03-04 LAB
ALBUMIN SERPL BCP-MCNC: 2.6 G/DL (ref 3.2–4.9)
ALBUMIN/GLOB SERPL: 0.8 G/DL
ALP SERPL-CCNC: 164 U/L (ref 30–99)
ALT SERPL-CCNC: 19 U/L (ref 2–50)
ANION GAP SERPL CALC-SCNC: 10 MMOL/L (ref 7–16)
AST SERPL-CCNC: 41 U/L (ref 12–45)
BILIRUB SERPL-MCNC: 0.7 MG/DL (ref 0.1–1.5)
BUN SERPL-MCNC: 10 MG/DL (ref 8–22)
CALCIUM ALBUM COR SERPL-MCNC: 9 MG/DL (ref 8.5–10.5)
CALCIUM SERPL-MCNC: 7.9 MG/DL (ref 8.4–10.2)
CHLORIDE SERPL-SCNC: 103 MMOL/L (ref 96–112)
CO2 SERPL-SCNC: 22 MMOL/L (ref 20–33)
CREAT SERPL-MCNC: 0.54 MG/DL (ref 0.5–1.4)
ERYTHROCYTE [DISTWIDTH] IN BLOOD BY AUTOMATED COUNT: 47 FL (ref 35.9–50)
FLUAV RNA SPEC QL NAA+PROBE: NEGATIVE
FLUBV RNA SPEC QL NAA+PROBE: NEGATIVE
GFR SERPLBLD CREATININE-BSD FMLA CKD-EPI: 94 ML/MIN/1.73 M 2
GLOBULIN SER CALC-MCNC: 3.2 G/DL (ref 1.9–3.5)
GLUCOSE SERPL-MCNC: 86 MG/DL (ref 65–99)
HCT VFR BLD AUTO: 33.7 % (ref 37–47)
HGB BLD-MCNC: 10.5 G/DL (ref 12–16)
LACTATE SERPL-SCNC: 1 MMOL/L (ref 0.5–2)
LACTATE SERPL-SCNC: 1.1 MMOL/L (ref 0.5–2)
MAGNESIUM SERPL-MCNC: 1.9 MG/DL (ref 1.5–2.5)
MCH RBC QN AUTO: 25.9 PG (ref 27–33)
MCHC RBC AUTO-ENTMCNC: 31.2 G/DL (ref 33.6–35)
MCV RBC AUTO: 83.2 FL (ref 81.4–97.8)
PLATELET # BLD AUTO: 272 K/UL (ref 164–446)
PMV BLD AUTO: 10.5 FL (ref 9–12.9)
POTASSIUM SERPL-SCNC: 3.6 MMOL/L (ref 3.6–5.5)
PROCALCITONIN SERPL-MCNC: 0.37 NG/ML
PROT SERPL-MCNC: 5.8 G/DL (ref 6–8.2)
RBC # BLD AUTO: 4.05 M/UL (ref 4.2–5.4)
SARS-COV-2 RNA RESP QL NAA+PROBE: NOTDETECTED
SODIUM SERPL-SCNC: 135 MMOL/L (ref 135–145)
SPECIMEN SOURCE: NORMAL
UFH PPP CHRO-ACNC: 0.11 IU/ML
UFH PPP CHRO-ACNC: 0.2 IU/ML
UFH PPP CHRO-ACNC: 0.24 IU/ML
UFH PPP CHRO-ACNC: 0.36 IU/ML
WBC # BLD AUTO: 9.9 K/UL (ref 4.8–10.8)

## 2023-03-04 PROCEDURE — 94760 N-INVAS EAR/PLS OXIMETRY 1: CPT

## 2023-03-04 PROCEDURE — A9270 NON-COVERED ITEM OR SERVICE: HCPCS | Performed by: HOSPITALIST

## 2023-03-04 PROCEDURE — 700105 HCHG RX REV CODE 258: Performed by: HOSPITALIST

## 2023-03-04 PROCEDURE — 93306 TTE W/DOPPLER COMPLETE: CPT

## 2023-03-04 PROCEDURE — 80053 COMPREHEN METABOLIC PANEL: CPT

## 2023-03-04 PROCEDURE — 85027 COMPLETE CBC AUTOMATED: CPT

## 2023-03-04 PROCEDURE — 83735 ASSAY OF MAGNESIUM: CPT

## 2023-03-04 PROCEDURE — 770020 HCHG ROOM/CARE - TELE (206)

## 2023-03-04 PROCEDURE — 99232 SBSQ HOSP IP/OBS MODERATE 35: CPT | Performed by: FAMILY MEDICINE

## 2023-03-04 PROCEDURE — 36415 COLL VENOUS BLD VENIPUNCTURE: CPT

## 2023-03-04 PROCEDURE — 85520 HEPARIN ASSAY: CPT | Mod: 91

## 2023-03-04 PROCEDURE — 83605 ASSAY OF LACTIC ACID: CPT | Mod: 91

## 2023-03-04 PROCEDURE — 84145 PROCALCITONIN (PCT): CPT

## 2023-03-04 PROCEDURE — 700102 HCHG RX REV CODE 250 W/ 637 OVERRIDE(OP): Performed by: HOSPITALIST

## 2023-03-04 PROCEDURE — 51798 US URINE CAPACITY MEASURE: CPT

## 2023-03-04 PROCEDURE — 700111 HCHG RX REV CODE 636 W/ 250 OVERRIDE (IP): Performed by: HOSPITALIST

## 2023-03-04 RX ADMIN — HEPARIN SODIUM 3000 UNITS: 1000 INJECTION, SOLUTION INTRAVENOUS; SUBCUTANEOUS at 13:32

## 2023-03-04 RX ADMIN — SODIUM CHLORIDE 1000 ML: 9 INJECTION, SOLUTION INTRAVENOUS at 18:28

## 2023-03-04 RX ADMIN — HEPARIN SODIUM 3000 UNITS: 1000 INJECTION, SOLUTION INTRAVENOUS; SUBCUTANEOUS at 21:50

## 2023-03-04 RX ADMIN — HEPARIN SODIUM 3000 UNITS: 1000 INJECTION, SOLUTION INTRAVENOUS; SUBCUTANEOUS at 05:30

## 2023-03-04 RX ADMIN — SENNOSIDES AND DOCUSATE SODIUM 2 TABLET: 50; 8.6 TABLET ORAL at 04:41

## 2023-03-04 RX ADMIN — HEPARIN SODIUM 24 UNITS/KG/HR: 5000 INJECTION, SOLUTION INTRAVENOUS at 13:25

## 2023-03-04 ASSESSMENT — COGNITIVE AND FUNCTIONAL STATUS - GENERAL
DRESSING REGULAR UPPER BODY CLOTHING: A LITTLE
DAILY ACTIVITIY SCORE: 16
WALKING IN HOSPITAL ROOM: A LOT
DRESSING REGULAR LOWER BODY CLOTHING: A LOT
EATING MEALS: A LITTLE
TOILETING: A LITTLE
HELP NEEDED FOR BATHING: A LOT
STANDING UP FROM CHAIR USING ARMS: A LOT
SUGGESTED CMS G CODE MODIFIER DAILY ACTIVITY: CK
PERSONAL GROOMING: A LITTLE
TURNING FROM BACK TO SIDE WHILE IN FLAT BAD: A LITTLE
SUGGESTED CMS G CODE MODIFIER MOBILITY: CL
MOVING TO AND FROM BED TO CHAIR: A LOT
MOVING FROM LYING ON BACK TO SITTING ON SIDE OF FLAT BED: A LOT
CLIMB 3 TO 5 STEPS WITH RAILING: A LOT
MOBILITY SCORE: 13

## 2023-03-04 ASSESSMENT — LIFESTYLE VARIABLES
HAVE YOU EVER FELT YOU SHOULD CUT DOWN ON YOUR DRINKING: NO
AVERAGE NUMBER OF DAYS PER WEEK YOU HAVE A DRINK CONTAINING ALCOHOL: 2
TOTAL SCORE: 0
HOW MANY TIMES IN THE PAST YEAR HAVE YOU HAD 5 OR MORE DRINKS IN A DAY: 0
EVER HAD A DRINK FIRST THING IN THE MORNING TO STEADY YOUR NERVES TO GET RID OF A HANGOVER: NO
HAVE PEOPLE ANNOYED YOU BY CRITICIZING YOUR DRINKING: NO
EVER FELT BAD OR GUILTY ABOUT YOUR DRINKING: NO
ALCOHOL_USE: YES
CONSUMPTION TOTAL: NEGATIVE
TOTAL SCORE: 0
TOTAL SCORE: 0
ON A TYPICAL DAY WHEN YOU DRINK ALCOHOL HOW MANY DRINKS DO YOU HAVE: 1

## 2023-03-04 ASSESSMENT — PATIENT HEALTH QUESTIONNAIRE - PHQ9
7. TROUBLE CONCENTRATING ON THINGS, SUCH AS READING THE NEWSPAPER OR WATCHING TELEVISION: NOT AT ALL
2. FEELING DOWN, DEPRESSED, IRRITABLE, OR HOPELESS: NOT AT ALL
8. MOVING OR SPEAKING SO SLOWLY THAT OTHER PEOPLE COULD HAVE NOTICED. OR THE OPPOSITE, BEING SO FIGETY OR RESTLESS THAT YOU HAVE BEEN MOVING AROUND A LOT MORE THAN USUAL: NOT AT ALL
1. LITTLE INTEREST OR PLEASURE IN DOING THINGS: NOT AT ALL
4. FEELING TIRED OR HAVING LITTLE ENERGY: NOT AT ALL
9. THOUGHTS THAT YOU WOULD BE BETTER OFF DEAD, OR OF HURTING YOURSELF: NOT AT ALL
5. POOR APPETITE OR OVEREATING: NOT AT ALL
SUM OF ALL RESPONSES TO PHQ QUESTIONS 1-9: 0
3. TROUBLE FALLING OR STAYING ASLEEP OR SLEEPING TOO MUCH: NOT AT ALL
6. FEELING BAD ABOUT YOURSELF - OR THAT YOU ARE A FAILURE OR HAVE LET YOURSELF OR YOUR FAMILY DOWN: NOT AL ALL
SUM OF ALL RESPONSES TO PHQ9 QUESTIONS 1 AND 2: 0

## 2023-03-04 ASSESSMENT — FIBROSIS 4 INDEX: FIB4 SCORE: 2.86

## 2023-03-04 ASSESSMENT — ENCOUNTER SYMPTOMS
NAUSEA: 0
SHORTNESS OF BREATH: 0
FEVER: 0
CHILLS: 0
COUGH: 0
ABDOMINAL PAIN: 0

## 2023-03-04 ASSESSMENT — PAIN DESCRIPTION - PAIN TYPE: TYPE: ACUTE PAIN

## 2023-03-04 NOTE — ASSESSMENT & PLAN NOTE
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  Therapeutic anticoagulation.  Oxygen as needed, Respiratory protocol, Bronchodilators, Incentive spirometry

## 2023-03-04 NOTE — PROGRESS NOTES
4 Eyes Skin Assessment Completed by KATE Kennedy and KATE Fernandez.    Head WDL  Ears WDL  Nose WDL  Mouth WDL  Neck WDL  Breast/Chest Redness, Blanching, and Rash under left breast  Shoulder Blades WDL  Spine Redness and Blanching  (R) Arm/Elbow/Hand WDL  (L) Arm/Elbow/Hand WDL  Abdomen WDL  Groin WDL  Scrotum/Coccyx/Buttocks WDL  (R) Leg Redness, Blanching, Swelling, Shiny, and Edema  (L) Leg Redness, Blanching, Swelling, Shiny, and Edema  (R) Heel/Foot/Toe WDL  (L) Heel/Foot/Toe WDL          Devices In Places Tele Box and Blood Pressure Cuff      Interventions In Place Gray Ear Foams, InterDry, Pillows, and Barrier Cream    Possible Skin Injury No    Pictures Uploaded Into Epic N/A  Wound Consult Placed N/A  RN Wound Prevention Protocol Ordered No

## 2023-03-04 NOTE — ASSESSMENT & PLAN NOTE
CT scan of the head did not show evidence for acute infarction or hemorrhage  Likely metabolic likely secondary to hypoxemia  Resolved

## 2023-03-04 NOTE — ED PROVIDER NOTES
ED Provider Note    CHIEF COMPLAINT  Chief Complaint   Patient presents with    Peripheral Edema     BLE x 1 wk    Shortness of Breath     Caregiver noted GREEN and SOB at rest yesterday   Orthopnea Pt denies CP     EXTERNAL RECORDS REVIEWED  Outpatient Notes outside record reviewed from 1/24/2023 when the patient was seen in the clinic for peripheral vascular disease and bilateral lower extremity swelling.  There have been some concerns regarding her immobility .  Also longstanding antidepressants, screening for osteopenia.  Discoloration and suspected peripheral vascular disease was treated with initiation of horse [place] extract and diet modification    HPI/ROS  LIMITATION TO HISTORY   Select: AMS/Confusion/Dementia  OUTSIDE HISTORIAN(S):  Family Niece at bedside    Jess Diallo is a 78 y.o. female with a past medical history of paranoid schizophrenia/bipolar who presents urgency room brought in by her niece for concerns regarding worsening shortness of breath and some memory issues.  She believes that she has some element of dementia though she is not familiar with the extent of her medical issues.  She lives with a caregiver who is not present at the bedside and believes that there has been ongoing swelling in her legs for several months and progressive worsening of her breathing over the course of several weeks.  Patient does not have any diagnosis of lung disease, CHF or recent medical illness.  She was noted to have some peripheral edema in the past and overall is on antidepressants/antipsychotics and supplements.  The patient is pleasant, alert but not fully oriented, denies any acute pain, says that she does experience some shortness of breath but denies chest discomfort or pressure sensations, denies any recent fevers or abdominal pain.    PAST MEDICAL HISTORY   has a past medical history of Bipolar 1 disorder (HCC).    SURGICAL HISTORY   has a past surgical history that includes other.    FAMILY  "HISTORY  No family history on file.    SOCIAL HISTORY  Social History     Tobacco Use    Smoking status: Some Days     Years: 15.00     Types: Cigarettes    Smokeless tobacco: Not on file   Vaping Use    Vaping Use: Never used   Substance and Sexual Activity    Alcohol use: No    Drug use: No    Sexual activity: Not Currently       CURRENT MEDICATIONS  Home Medications       Reviewed by Stan Dumas (Pharmacy Tech) on 03/03/23 at 1705  Med List Status: Complete     Medication Last Dose Status        Patient Juan Carlos Taking any Medications                           ALLERGIES  Allergies   Allergen Reactions    Pcn [Penicillins] Unspecified     Unknown childhood reaction        PHYSICAL EXAM  VITAL SIGNS: BP (!) 122/36   Pulse (!) 101   Temp 37.3 °C (99.1 °F) (Temporal)   Resp (!) 22   Ht 1.753 m (5' 9\")   Wt 89 kg (196 lb 3.4 oz)   SpO2 98%   BMI 28.98 kg/m²    Genl: pleasant but chronically ill appearing F sitting in gurney comfortably, speaking in short sentences, appears in no acute distress   Head: NC/AT   ENT: Mucous membranes dry, posterior pharynx clear, uvula midline, nares patent bilaterally   Eyes: Normal sclera, pupils equal round reactive to light  Neck: Supple, FROM, no LAD appreciated   Pulmonary: Lungs are globally diminished  below mid chest, no notable rhonchi  Chest: No TTP  CV:  Tachycardia, no murmur appreciated, pulses 2+ in both upper and lower extremities, bilateral mid calf peripheral edema is noted.  Abdomen: soft, NT/ND; no rebound/guarding, no masses palpated, no HSM   : no CVA tenderness, questionable suprapubic discomfort  Musculoskeletal: Pain free ROM of the neck. Moving upper and lower extremities and spontaneous in coordinated fashion  Neuro: A&Ox1 (person), speech short as above, gait not assessed, no focal deficits appreciated  Skin: No rash or lesions.  No pallor or jaundice.  No cyanosis.  Warm and dry.     DIAGNOSTIC STUDIES / PROCEDURES  EKG  I have independently " interpreted this EKG  Results for orders placed or performed during the hospital encounter of 23   EKG   Result Value Ref Range    Report       [facility] Emergency Dept.    Test Date:  2023  Pt Name:    SD RODRIGUEZ                  Department: [facility]  MRN:        [id number]                      Room:  Gender:     Female                       Technician: zeke  :        1945                   Requested By:ER TRIAGE PROTOCOL  Order #:    [id number]                    Reading MD: Sina Gutierrez MD    Measurements  Intervals                                Axis  Rate:       114                          P:  NV:                                      QRS:        -1  QRSD:       142                          T:          33  QT:         319  QTc:        440    Interpretive Statements  Atrial flutterMotion artifact present, Sinus rhythm at rate of 115, normal  NV,  QTC, no acute ischemia or infarction. Changed from prior dated 11/17/15  Electronically Signed On 3-3-2023 17:07:08 PST by Sina Gutierrez MD       LABS  Labs Reviewed   CBC WITH DIFFERENTIAL - Abnormal; Notable for the following components:       Result Value    WBC 12.1 (*)     Hemoglobin 11.8 (*)     MCH 26.2 (*)     MCHC 31.2 (*)     Neutrophils-Polys 85.40 (*)     Lymphocytes 5.30 (*)     Neutrophils (Absolute) 10.31 (*)     Lymphs (Absolute) 0.64 (*)     Monos (Absolute) 1.03 (*)     All other components within normal limits    Narrative:     Biotin intake of greater than 5 mg per day may interfere with  troponin levels, causing false low values.   COMP METABOLIC PANEL - Abnormal; Notable for the following components:    Sodium 129 (*)     Glucose 133 (*)     Calcium 8.1 (*)     AST(SGOT) 58 (*)     Alkaline Phosphatase 189 (*)     Albumin 2.8 (*)     Globulin 3.9 (*)     All other components within normal limits    Narrative:     Biotin intake of greater than 5 mg per day may interfere with  troponin levels, causing false low  "values.   PROBRAIN NATRIURETIC PEPTIDE, NT - Abnormal; Notable for the following components:    NT-proBNP 326 (*)     All other components within normal limits    Narrative:     Biotin intake of greater than 5 mg per day may interfere with  troponin levels, causing false low values.   LACTIC ACID    Narrative:     Biotin intake of greater than 5 mg per day may interfere with  troponin levels, causing false low values.   URINALYSIS    Narrative:     Indication for culture:->Evaluation for sepsis without a  clear source of infection   TROPONIN    Narrative:     Biotin intake of greater than 5 mg per day may interfere with  troponin levels, causing false low values.   COV-2 AND FLU A/B BY PCR ([facility]/[employer])   CORRECTED CALCIUM    Narrative:     Biotin intake of greater than 5 mg per day may interfere with  troponin levels, causing false low values.   ESTIMATED GFR    Narrative:     Biotin intake of greater than 5 mg per day may interfere with  troponin levels, causing false low values.   URINE CULTURE(NEW)    Narrative:     Indication for culture:->Evaluation for sepsis without a  clear source of infection   BLOOD CULTURE    Narrative:     Per Hospital Policy: Only change Specimen Src: to \"Line\" if  specified by physician order.   BLOOD CULTURE    Narrative:     Per Hospital Policy: Only change Specimen Src: to \"Line\" if  specified by physician order.     RADIOLOGY  I have independently interpreted the diagnostic imaging associated with this visit and am waiting the final reading from the radiologist.   My preliminary interpretation is as follows: Features as the patient has substantial amounts of decreased lung fields that could be due to fluid, infection and CT scan obtained shows what appears to be bilateral distal pulmonary emboli and multiple areas of pulmonary nodules to be more consistent with acute pregnancy.  Down the liver as possible metastatic lesions as well.  Radiologist interpretation:   CT-CTA CHEST " PULMONARY ARTERY W/ RECONS   Final Result         1.  Bilateral segmental and distal pulmonary emboli.   2.  Innumerable bilateral pulmonary nodules are most in keeping with pulmonary metastases.   3.  Multiple liver masses are concerning for liver metastases.   4.  Right hilar adenopathy which could be related to metastatic disease.      These findings were discussed with STACI PATEL on 3/3/2023 6:37 PM.            CT-HEAD W/O   Final Result      1.  There is no acute intracranial hemorrhage or infarct.      2.  White matter lucencies most consistent with small vessel ischemic change versus demyelination or gliosis.      3.  There is cerebral atrophy.         DX-CHEST-PORTABLE (1 VIEW)   Final Result         1. Low lung volume with hypoventilatory change and bibasilar atelectasis.      CT-ABDOMEN-PELVIS WITH    (Results Pending)     COURSE & MEDICAL DECISION MAKING    ED Observation Status? No; Patient does not meet criteria for ED Observation.     INITIAL ASSESSMENT, COURSE AND PLAN  Care Narrative:   Patient is seen and evaluated emergently as the patient was apparently hypoxic, short of breath and tachycardic.  She has baseline mental status changes and is alert and oriented to self only though it is difficult to fully differentiate how long she has been like this.  Caregiver is not immediately available and calls were not picked up.  Family member that is not power of  is at the bedside and has not seen the patient in some time but was present when the patient had shortness of breath earlier today.  I do believe that there is some element of baseline dementia based on her age though she is entirely sure of this.  At the initial assessment with her chest x-ray I was concerned about the possibility of heart failure with her peripheral edema labs and electrolytes and full medical work-up for the above differential was pursued.  She is at elevated risk for PE and CTA was ordered.  Lab work was grossly  remarkable for a multitude of abnormalities including leukocytosis with leftward shift, stable but chronic anemia, hyponatremia, nonspecific LFT elevations with no obstructive features and no evidence of pancreatitis.  Troponin is detectable but not grossly elevated lactate thankfully is not elevated above 2.  Her BNP is not significantly elevated and there is no significant findings of urinary tract infection or stone pathology.  CT head no evidence of acute bleed or other intracranial process.  CT Chest scan came back with multiple findings for likely PEs and possible lung metastatic lesions.  After discussion with the radiologist I will obtain a CT of the abdomen.  Patient will require admission for management of her new PEs, her new hypoxia, and further diagnostic work-up for new malignancy of undetermined etiology.  At the time of admission with Dr. Venegas of the hospitalist service the CT of the abdomen pelvis is currently pending.  She has received initial dose of Rocephin as patient's tachycardia and elevated leukocyte count could point towards a septic etiology though there is no acute identifying infectious etiology at this time and she meets SIRS criteria but not septic criteria.  Ultras are also pending.  She is hemodynamically stable and resting comfortably in the gurney on 2 L via nasal cannula.    Lelo Diallo Is admitted in guarded condition.    CRITICAL CARE:  I saw and evaluated this patient. I personally provided 30 minutes of critical care time to the patient excluding billable procedures and directly and personally provided the following treatment and critical care management:  Critical Care Interventions  Multiple bedside assessments, coordination of care with family and other historical sources, Continuous hemodynamic and respiratory monitoring, Serial neurologic exams, Serial airway observations, and Fluid resuscitation    HYDRATION: Based on the patient's presentation of Dehydration,  Sepsis, and Tachycardia the patient was given IV fluids. IV Hydration was used because oral hydration was not adequate alone. Upon recheck following hydration, the patient was improved.      FINAL DIAGNOSIS  1. Hypoxia    2. Confusion    3. Acute congestive heart failure, unspecified heart failure type (HCC)    4. Shortness of breath    5. Leg swelling    6. Tachycardia    7. SIRS (systemic inflammatory response syndrome) (HCC)      Electronically signed by: Matt Andino M.D., 3/3/2023 5:03 PM

## 2023-03-04 NOTE — CARE PLAN
The patient is Watcher - Medium risk of patient condition declining or worsening    Shift Goals  Clinical Goals: Treat PE, wean O2  Patient Goals: Rest    Progress made toward(s) clinical / shift goals:    Problem: Hemodynamics  Goal: Patient's hemodynamics, fluid balance and neurologic status will be stable or improve  Outcome: Progressing     Problem: Fluid Volume  Goal: Fluid volume balance will be maintained  Outcome: Progressing     Problem: Respiratory  Goal: Patient will achieve/maintain optimum respiratory ventilation and gas exchange  Outcome: Progressing       Patient is not progressing towards the following goals:

## 2023-03-04 NOTE — PROGRESS NOTES
78F, PMHx schizophrenia.  Here with acute hypoxemic respiratory failure.  CT imaging shows PE, and concerning for metastatic cancer.        CT abdomen pelvis pending

## 2023-03-04 NOTE — ASSESSMENT & PLAN NOTE
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

## 2023-03-04 NOTE — PROGRESS NOTES
0100- Care assumed and report received from Brent LOVE. Pt resting in bed, call light in reach. Heparin gtt verified per policy.

## 2023-03-04 NOTE — ASSESSMENT & PLAN NOTE
Likely related to metastatic disease  RESOLVED   Continue to monitor, avoid/minimize hepatotoxins as much as possible.

## 2023-03-04 NOTE — ASSESSMENT & PLAN NOTE
- Home meds are currently unknown  - CM spoke with pts ESTEFANIA who advised pt was on Geodon as an outpatient but that she was sedated on the same   - Nursing attempting to get home MAR   - Currently, pt is without any paranoid delusions, is pleasant and cooperative - await MAR to further dictate medications

## 2023-03-04 NOTE — ASSESSMENT & PLAN NOTE
Imaging shows evidence for bilateral segmental and distal pulmonary emboli.  Likely provoked by malignancy   No RV strain on echo, negative trop  Heparin drip - transitioned to Eliquis  Oxygen as needed, Respiratory protocol, Incentive spirometry

## 2023-03-04 NOTE — ED NOTES
Med Rec completed per patient   Allergies reviewed  No ORAL antibiotics in last 30 days    Patient states that she is not currently taking any daily medications at this time

## 2023-03-04 NOTE — ASSESSMENT & PLAN NOTE
SIRS criteria identified on my evaluation include:  Tachycardia, with heart rate greater than 90 BPM, Tachypnea, with respirations greater than 20 per minute and Leukocytosis, with WBC greater than 12,000  No clear evidence for infection at this point.  SIRS criteria could be related to her metastatic cancer  Started on ceftriaxone in the emergency room, stopped as no evidence of infection

## 2023-03-04 NOTE — CARE PLAN
The patient is Stable - Low risk of patient condition declining or worsening    Shift Goals  Clinical Goals: heparin gtt, improve dyspnea  Patient Goals: Rest  Family Goals: LIZETH    Progress made toward(s) clinical / shift goals:  heparin gtt    Patient is not progressing towards the following goals:      Problem: Knowledge Deficit - Standard  Goal: Patient and family/care givers will demonstrate understanding of plan of care, disease process/condition, diagnostic tests and medications  Outcome: Progressing

## 2023-03-04 NOTE — PROGRESS NOTES
Hospital Medicine Daily Progress Note    Date of Service  3/4/2023    Chief Complaint  Jess Diallo is a 78 y.o. female admitted 3/3/2023 with AMS    Hospital Course  This is a female with a history of bipolar disorder vs schizophrenia, PVD, depression who presented to hospital with SOB and LE edema with concurrent confusion.  On arrival to the ER, she was afebrile, tachycardic and requiring 3L O2. White count was elevated, CTA demonstrated multiple segmental and distal pulmonary emboli and she was found to have pulmonary metastases along with multiple liver masses.  Due to the metastatic lesions seen on CTA, CT a/p was obtained which demonstrated possible adrenal mets, a potential cecal mass, as well as LN mets.     Interval Problem Update  3/4 - Stop antibiotics, no signs of infection on imaging or exam - procal likely elevated from malignancy and pt having diarrhea on antibiotics.  Continue heparin for now, if pt can swallow, transition to Eliquis - SLP pending as nursing has concerns over swallow ability.   Referral placed to Onc as an outpatient, primary likely a cecal mass - could not discuss prognosis or pt today as she was minimally interactive, though pleasant.  IP Palliative consulted.  Pt's closest living relative is her niece, but her niece states someone named Joseline has POA and lives in [place], but is concerned she is taking financial advantage of the patient and is unable to get a hold of her. I have asked her to email me any POA documentation she may have. Checking echo, trop negative, RV:LV ratio is 1.0.     I have discussed this patient's plan of care and discharge plan at IDT rounds today with Case Management, Nursing, Nursing leadership, and other members of the IDT team.    Consultants/Specialty  Palliative    Code Status  Full Code    Disposition  Patient is not medically cleared for discharge.   Anticipate discharge to to skilled nursing facility.  I have placed the appropriate orders for  post-discharge needs.    Review of Systems  Review of Systems   Constitutional:  Negative for chills and fever.   Respiratory:  Negative for cough and shortness of breath.    Cardiovascular:  Negative for chest pain and leg swelling.   Gastrointestinal:  Negative for abdominal pain and nausea.   All other systems reviewed and are negative.     Physical Exam  Temp:  [37.2 °C (98.9 °F)-37.3 °C (99.1 °F)] 37.2 °C (98.9 °F)  Pulse:  [] 88  Resp:  [19-32] 19  BP: (120-143)/(36-78) 120/59  SpO2:  [85 %-98 %] 95 %    Physical Exam  Vitals and nursing note reviewed.   Constitutional:       Appearance: Normal appearance. She is not ill-appearing.   HENT:      Head: Normocephalic and atraumatic.      Mouth/Throat:      Mouth: Mucous membranes are moist.   Cardiovascular:      Rate and Rhythm: Normal rate and regular rhythm.   Pulmonary:      Effort: Pulmonary effort is normal. No respiratory distress.      Breath sounds: Normal breath sounds. No wheezing or rales.   Abdominal:      General: Abdomen is flat. Bowel sounds are normal.      Palpations: Abdomen is soft.   Musculoskeletal:         General: No swelling.   Skin:     General: Skin is warm and dry.   Neurological:      General: No focal deficit present.      Mental Status: She is alert.      Comments: Unclear intellectual baseline, pt minimally interactive.    Psychiatric:         Mood and Affect: Mood normal.         Behavior: Behavior normal.       Fluids    Intake/Output Summary (Last 24 hours) at 3/4/2023 0738  Last data filed at 3/4/2023 0630  Gross per 24 hour   Intake 761.77 ml   Output 1 ml   Net 760.77 ml       Laboratory  Recent Labs     03/03/23  1710 03/04/23  0438   WBC 12.1* 9.9   RBC 4.51 4.05*   HEMOGLOBIN 11.8* 10.5*   HEMATOCRIT 37.8 33.7*   MCV 83.8 83.2   MCH 26.2* 25.9*   MCHC 31.2* 31.2*   RDW 47.2 47.0   PLATELETCT 323 272   MPV 10.1 10.5     Recent Labs     03/03/23  1710 03/04/23  0438   SODIUM 129* 135   POTASSIUM 4.4 3.6   CHLORIDE 96  103   CO2 20 22   GLUCOSE 133* 86   BUN 13 10   CREATININE 0.78 0.54   CALCIUM 8.1* 7.9*     Recent Labs     03/03/23  1710   APTT 33.7   INR 1.24*               Imaging  CT-ABDOMEN-PELVIS WITH   Final Result      1.  There are multiple bilobar hepatic lesions consistent with metastatic disease.   2.  There are bilateral pulmonary nodules in the lung bases consistent with metastatic disease.   3.  Slightly plump adrenal glands without definitive mass. Metastasis is not excluded.   4.  Abnormal soft tissue in the medial cecum suspicious for potential cecal mass.   5.  There is a necrotic lymph node in the portacaval region suspicious for metastasis.   6.  Distal colonic diverticulosis without diverticulitis.   7.  There is a large hiatal hernia.   8.  Incidental simple renal cyst. No further imaging follow-up per ACR guidelines.      CT-CTA CHEST PULMONARY ARTERY W/ RECONS   Final Result         1.  Bilateral segmental and distal pulmonary emboli.   2.  Innumerable bilateral pulmonary nodules are most in keeping with pulmonary metastases.   3.  Multiple liver masses are concerning for liver metastases.   4.  Right hilar adenopathy which could be related to metastatic disease.      These findings were discussed with STACI PATEL on 3/3/2023 6:37 PM.            CT-HEAD W/O   Final Result      1.  There is no acute intracranial hemorrhage or infarct.      2.  White matter lucencies most consistent with small vessel ischemic change versus demyelination or gliosis.      3.  There is cerebral atrophy.         DX-CHEST-PORTABLE (1 VIEW)   Final Result         1. Low lung volume with hypoventilatory change and bibasilar atelectasis.           Assessment/Plan  * Acute hypoxemic respiratory failure (HCC)- (present on admission)  Assessment & Plan  Likely secondary to pulmonary embolism and metastatic disease.  Therapeutic anticoagulation.  Oxygen as needed, Respiratory protocol, Bronchodilators, Incentive spirometry      Normocytic anemia  Assessment & Plan  - No current s/s of bleeding    Paranoid schizophrenia (HCC)  Assessment & Plan  - Home meds are currently unknown    SIRS (systemic inflammatory response syndrome) (HCC)- (present on admission)  Assessment & Plan  SIRS criteria identified on my evaluation include:  Tachycardia, with heart rate greater than 90 BPM, Tachypnea, with respirations greater than 20 per minute and Leukocytosis, with WBC greater than 12,000  No clear evidence for infection at this point.  SIRS criteria could be related to her metastatic cancer  Started on ceftriaxone in the emergency room, continue for now follow cultures and sensitivities    Metastatic disease (HCC)- (present on admission)  Assessment & Plan  - Likely cecal mass as primary  - Mets to the lungs, liver, adrenals, LNs on imaging  - Referral placed to Oncology, if pt wishes to pursue palliative treatment, will need a tissue biopsy     Encephalopathy acute- (present on admission)  Assessment & Plan  CT scan of the head did not show evidence for acute infarction or hemorrhage  Likely metabolic likely secondary to hypoxemia  Consider further diagnostic testing /imaging if encephalopathy does not improve with above measures.     Elevated liver enzymes- (present on admission)  Assessment & Plan  Likely related to metastatic disease  RESOLVED   Continue to monitor, avoid/minimize hepatotoxins as much as possible.     Hyponatremia- (present on admission)  Assessment & Plan  Resolved     Acute pulmonary embolism (HCC)- (present on admission)  Assessment & Plan  Imaging shows evidence for bilateral segmental and distal pulmonary emboli.  We will check echocardiography to rule out cardiac strain, trop is negative   Heparin drip - transition to Eliquis after SLP evaluates pt    Oxygen as needed, Respiratory protocol, Incentive spirometry          VTE prophylaxis: SCDs/TEDs and heparin ppx    I have performed a physical exam and reviewed and  updated ROS and Plan today (3/4/2023). In review of yesterday's note (3/3/2023), there are no changes except as documented above.

## 2023-03-04 NOTE — ED NOTES
Pt AA O x 1  AMS may partially be R/T undiagnosed dementia Cyanotic Dyspneic and hypoxic O/A  Pitting edema BLE to mid shin    Pt is not vaccinated for Covid Possibly has flu shot

## 2023-03-04 NOTE — PROGRESS NOTES
Telemetry Shift Summary     Rhythm SR-ST  HR Range   Ectopy n/a  Measurements 0.18/0.08/0.40           Normal Values  Rhythm SR  HR Range    Measurements 0.12-0.20 / 0.06-0.10  / 0.30-0.52

## 2023-03-04 NOTE — H&P
Hospital Medicine History & Physical Note    Date of Service  3/3/2023    Primary Care Physician  ELIZABETH Retana    Consultants  None     Code Status  Full Code    Chief Complaint  Chief Complaint   Patient presents with    Peripheral Edema     BLE x 1 wk    Shortness of Breath     Caregiver noted GREEN and SOB at rest yesterday   Orthopnea Pt denies CP     History of Presenting Illness  Jess Diallo is a 78 y.o. female with a past medical history of schizophrenia who presented 3/3/2023 with shortness of breath and lower extremity swelling.  Most of the history was obtained from emergency department physician, patient chart and patient niece present at bedside as the patient was confused during my bedside evaluation.  Apparently the patient has been having progressively worsening generalized weakness and shortness of breath over the past 1 week.  Reportedly no fevers or chills.  Patient niece adds that the patient is ambulatory using a walker at baseline.  She is intermittently confused but has become more confused recently.  No reported falls or loss of consciousness.    I discussed the plan of care with emergency department physician, the patient and patient niece present at bedside in the emergency room.    Review of Systems  Review of Systems   Constitutional:  Positive for malaise/fatigue. Negative for chills and fever.   Eyes:  Negative for discharge and redness.   Respiratory:  Positive for shortness of breath. Negative for cough and stridor.    Cardiovascular:  Negative for chest pain and leg swelling.   Gastrointestinal:  Negative for abdominal pain and vomiting.   Genitourinary:  Negative for flank pain.   Musculoskeletal:  Negative for myalgias.   Skin: Negative.    Neurological:  Negative for focal weakness.   Endo/Heme/Allergies:  Does not bruise/bleed easily.   Psychiatric/Behavioral:  The patient is not nervous/anxious.      Past Medical History   has a past medical history of Bipolar 1  disorder (HCC).    Surgical History   has a past surgical history that includes other.     Family History  family history is not on file.      Social History   reports that she has been smoking cigarettes. She does not have any smokeless tobacco history on file. She reports that she does not drink alcohol and does not use drugs.    Allergies  Allergies   Allergen Reactions    Pcn [Penicillins] Unspecified     Unknown childhood reaction      Medications  None     Physical Exam  Temp:  [37.3 °C (99.1 °F)] 37.3 °C (99.1 °F)  Pulse:  [] 101  Resp:  [22-32] 22  BP: (122-143)/(36-78) 143/78  SpO2:  [85 %-98 %] 98 %  Blood Pressure : (!) 122/36   Temperature: 37.3 °C (99.1 °F)   Pulse: (!) 101   Respiration: (!) 22   Pulse Oximetry: 98 %     Physical Exam  Constitutional:       General: She is not in acute distress.  HENT:      Head: Normocephalic and atraumatic.      Right Ear: External ear normal.      Left Ear: External ear normal.      Nose: No congestion or rhinorrhea.      Mouth/Throat:      Mouth: Mucous membranes are moist.      Pharynx: No oropharyngeal exudate or posterior oropharyngeal erythema.   Eyes:      General: No scleral icterus.        Right eye: No discharge.         Left eye: No discharge.      Conjunctiva/sclera: Conjunctivae normal.      Pupils: Pupils are equal, round, and reactive to light.   Cardiovascular:      Rate and Rhythm: Tachycardia present.      Heart sounds:     No friction rub. No gallop.   Pulmonary:      Breath sounds: Rhonchi present.      Comments: Tachypnea.  Requiring 3 L of oxygen to achieve adequate saturation.  Is able to speak in full sentences.  Bilateral rhonchi  Abdominal:      General: Abdomen is flat. There is no distension.      Tenderness: There is no guarding.   Musculoskeletal:         General: No swelling.      Cervical back: Neck supple. No rigidity. No muscular tenderness.      Right lower leg: No edema.      Left lower leg: No edema.   Skin:     Capillary  Refill: Capillary refill takes 2 to 3 seconds.      Coloration: Skin is not jaundiced or pale.      Findings: No bruising or erythema.   Neurological:      Mental Status: She is alert. She is disoriented.   Psychiatric:         Mood and Affect: Mood normal.       Laboratory:  Recent Labs     03/03/23  1710   WBC 12.1*   RBC 4.51   HEMOGLOBIN 11.8*   HEMATOCRIT 37.8   MCV 83.8   MCH 26.2*   MCHC 31.2*   RDW 47.2   PLATELETCT 323   MPV 10.1     Recent Labs     03/03/23  1710   SODIUM 129*   POTASSIUM 4.4   CHLORIDE 96   CO2 20   GLUCOSE 133*   BUN 13   CREATININE 0.78   CALCIUM 8.1*     Recent Labs     03/03/23  1710   ALTSGPT 24   ASTSGOT 58*   ALKPHOSPHAT 189*   TBILIRUBIN 0.8   GLUCOSE 133*         Recent Labs     03/03/23  1710   NTPROBNP 326*         Recent Labs     03/03/23  1710   TROPONINT 13     Imaging:  CT-CTA CHEST PULMONARY ARTERY W/ RECONS   Final Result         1.  Bilateral segmental and distal pulmonary emboli.   2.  Innumerable bilateral pulmonary nodules are most in keeping with pulmonary metastases.   3.  Multiple liver masses are concerning for liver metastases.   4.  Right hilar adenopathy which could be related to metastatic disease.      These findings were discussed with STACI PATEL on 3/3/2023 6:37 PM.            CT-HEAD W/O   Final Result      1.  There is no acute intracranial hemorrhage or infarct.      2.  White matter lucencies most consistent with small vessel ischemic change versus demyelination or gliosis.      3.  There is cerebral atrophy.         DX-CHEST-PORTABLE (1 VIEW)   Final Result         1. Low lung volume with hypoventilatory change and bibasilar atelectasis.      CT-ABDOMEN-PELVIS WITH    (Results Pending)     Assessment/Plan:  Justification for Admission Status  I anticipate this patient will require at least two midnights for appropriate medical management, necessitating inpatient admission because the patient has acute hypoxemic respiratory failure    * Acute  hypoxemic respiratory failure (HCC)- (present on admission)  Assessment & Plan  Likely secondary to pulmonary embolism and metastatic disease.  Therapeutic anticoagulation.  Oxygen as needed, Respiratory protocol, Bronchodilators, Incentive spirometry     SIRS (systemic inflammatory response syndrome) (HCC)- (present on admission)  Assessment & Plan  SIRS criteria identified on my evaluation include:  Tachycardia, with heart rate greater than 90 BPM, Tachypnea, with respirations greater than 20 per minute and Leukocytosis, with WBC greater than 12,000  No clear evidence for infection at this point.  SIRS criteria could be related to her metastatic cancer  Started on ceftriaxone in the emergency room, continue for now follow cultures and sensitivities    Metastatic disease (HCC)- (present on admission)  Assessment & Plan  CT imaging shows evidence for innumerable bilateral pulmonary nodules are most in keeping with pulmonary metastases.   Multiple liver masses are concerning for liver metastases.   We will check CT abdomen pelvis to look for primary  Consider further diagnostic testing/biopsies according to CT results/goals of care    Encephalopathy acute- (present on admission)  Assessment & Plan  CT scan of the head did not show evidence for acute infarction or hemorrhage  Likely metabolic likely secondary to hypoxemia, hyponatremia    Anticipate improvement with correcting/treating hypoxemia, hyponatremia    Consider further diagnostic testing /imaging if encephalopathy does not improve with above measures.     Acute pulmonary embolism (HCC)- (present on admission)  Assessment & Plan  Imaging shows evidence for bilateral segmental and distal pulmonary emboli.  We will check echocardiography to rule out cardiac strain and assess whether or not the patient would be a candidate for an EKOS procedure.  We will start therapeutic anticoagulation    Oxygen as needed, Respiratory protocol, Incentive spirometry      Elevated liver enzymes- (present on admission)  Assessment & Plan  Likely related to metastatic disease  Continue to monitor, avoid/minimize hepatotoxins as much as possible.     Hyponatremia- (present on admission)  Assessment & Plan  Hypovolemic hyponatremia  I expect Na to improve with IVF    VTE prophylaxis: SCDs/TEDs and therapeutic anticoagulation with heparin drip

## 2023-03-05 PROBLEM — R41.89 COGNITIVE CHANGE: Status: ACTIVE | Noted: 2023-03-05

## 2023-03-05 LAB
ALBUMIN SERPL BCP-MCNC: 2.3 G/DL (ref 3.2–4.9)
ALBUMIN/GLOB SERPL: 0.7 G/DL
ALP SERPL-CCNC: 163 U/L (ref 30–99)
ALT SERPL-CCNC: 18 U/L (ref 2–50)
ANION GAP SERPL CALC-SCNC: 11 MMOL/L (ref 7–16)
AST SERPL-CCNC: 37 U/L (ref 12–45)
BASOPHILS # BLD AUTO: 0.4 % (ref 0–1.8)
BASOPHILS # BLD: 0.03 K/UL (ref 0–0.12)
BILIRUB SERPL-MCNC: 0.6 MG/DL (ref 0.1–1.5)
BUN SERPL-MCNC: 7 MG/DL (ref 8–22)
CALCIUM ALBUM COR SERPL-MCNC: 9 MG/DL (ref 8.5–10.5)
CALCIUM SERPL-MCNC: 7.6 MG/DL (ref 8.4–10.2)
CHLORIDE SERPL-SCNC: 105 MMOL/L (ref 96–112)
CO2 SERPL-SCNC: 19 MMOL/L (ref 20–33)
CREAT SERPL-MCNC: 0.52 MG/DL (ref 0.5–1.4)
EOSINOPHIL # BLD AUTO: 0.17 K/UL (ref 0–0.51)
EOSINOPHIL NFR BLD: 2 % (ref 0–6.9)
ERYTHROCYTE [DISTWIDTH] IN BLOOD BY AUTOMATED COUNT: 46.8 FL (ref 35.9–50)
GFR SERPLBLD CREATININE-BSD FMLA CKD-EPI: 95 ML/MIN/1.73 M 2
GLOBULIN SER CALC-MCNC: 3.4 G/DL (ref 1.9–3.5)
GLUCOSE SERPL-MCNC: 88 MG/DL (ref 65–99)
HCT VFR BLD AUTO: 33.6 % (ref 37–47)
HGB BLD-MCNC: 10.4 G/DL (ref 12–16)
IMM GRANULOCYTES # BLD AUTO: 0.03 K/UL (ref 0–0.11)
IMM GRANULOCYTES NFR BLD AUTO: 0.4 % (ref 0–0.9)
LV EJECT FRACT  99904: 65
LV EJECT FRACT MOD 2C 99903: 67.04
LV EJECT FRACT MOD 4C 99902: 56.41
LV EJECT FRACT MOD BP 99901: 58.79
LYMPHOCYTES # BLD AUTO: 1.18 K/UL (ref 1–4.8)
LYMPHOCYTES NFR BLD: 13.9 % (ref 22–41)
MCH RBC QN AUTO: 25.7 PG (ref 27–33)
MCHC RBC AUTO-ENTMCNC: 31 G/DL (ref 33.6–35)
MCV RBC AUTO: 83.2 FL (ref 81.4–97.8)
MONOCYTES # BLD AUTO: 0.89 K/UL (ref 0–0.85)
MONOCYTES NFR BLD AUTO: 10.5 % (ref 0–13.4)
NEUTROPHILS # BLD AUTO: 6.17 K/UL (ref 2–7.15)
NEUTROPHILS NFR BLD: 72.8 % (ref 44–72)
NRBC # BLD AUTO: 0 K/UL
NRBC BLD-RTO: 0 /100 WBC
NT-PROBNP SERPL IA-MCNC: 403 PG/ML (ref 0–125)
PLATELET # BLD AUTO: 300 K/UL (ref 164–446)
PMV BLD AUTO: 10.2 FL (ref 9–12.9)
POTASSIUM SERPL-SCNC: 3.6 MMOL/L (ref 3.6–5.5)
PROT SERPL-MCNC: 5.7 G/DL (ref 6–8.2)
RBC # BLD AUTO: 4.04 M/UL (ref 4.2–5.4)
SODIUM SERPL-SCNC: 135 MMOL/L (ref 135–145)
UFH PPP CHRO-ACNC: 0.26 IU/ML
UFH PPP CHRO-ACNC: 0.31 IU/ML
WBC # BLD AUTO: 8.5 K/UL (ref 4.8–10.8)

## 2023-03-05 PROCEDURE — 85520 HEPARIN ASSAY: CPT | Mod: 91

## 2023-03-05 PROCEDURE — A9270 NON-COVERED ITEM OR SERVICE: HCPCS | Performed by: FAMILY MEDICINE

## 2023-03-05 PROCEDURE — 99233 SBSQ HOSP IP/OBS HIGH 50: CPT | Performed by: FAMILY MEDICINE

## 2023-03-05 PROCEDURE — 97162 PT EVAL MOD COMPLEX 30 MIN: CPT

## 2023-03-05 PROCEDURE — 83880 ASSAY OF NATRIURETIC PEPTIDE: CPT

## 2023-03-05 PROCEDURE — 93306 TTE W/DOPPLER COMPLETE: CPT | Mod: 26 | Performed by: INTERNAL MEDICINE

## 2023-03-05 PROCEDURE — 92610 EVALUATE SWALLOWING FUNCTION: CPT

## 2023-03-05 PROCEDURE — 700102 HCHG RX REV CODE 250 W/ 637 OVERRIDE(OP): Performed by: FAMILY MEDICINE

## 2023-03-05 PROCEDURE — 770020 HCHG ROOM/CARE - TELE (206)

## 2023-03-05 PROCEDURE — 80053 COMPREHEN METABOLIC PANEL: CPT

## 2023-03-05 PROCEDURE — 36415 COLL VENOUS BLD VENIPUNCTURE: CPT

## 2023-03-05 PROCEDURE — 94760 N-INVAS EAR/PLS OXIMETRY 1: CPT

## 2023-03-05 PROCEDURE — 700111 HCHG RX REV CODE 636 W/ 250 OVERRIDE (IP): Performed by: HOSPITALIST

## 2023-03-05 PROCEDURE — 85025 COMPLETE CBC W/AUTO DIFF WBC: CPT

## 2023-03-05 RX ADMIN — APIXABAN 10 MG: 5 TABLET, FILM COATED ORAL at 20:32

## 2023-03-05 RX ADMIN — APIXABAN 10 MG: 5 TABLET, FILM COATED ORAL at 11:29

## 2023-03-05 RX ADMIN — HEPARIN SODIUM 26 UNITS/KG/HR: 5000 INJECTION, SOLUTION INTRAVENOUS at 02:46

## 2023-03-05 ASSESSMENT — ENCOUNTER SYMPTOMS
FEVER: 0
CHILLS: 0
SHORTNESS OF BREATH: 0
ABDOMINAL PAIN: 0
NAUSEA: 0
COUGH: 0

## 2023-03-05 ASSESSMENT — GAIT ASSESSMENTS
DISTANCE (FEET): 3
GAIT LEVEL OF ASSIST: CONTACT GUARD ASSIST
DEVIATION: INCREASED BASE OF SUPPORT;SHUFFLED GAIT
ASSISTIVE DEVICE: HAND HELD ASSIST

## 2023-03-05 ASSESSMENT — COGNITIVE AND FUNCTIONAL STATUS - GENERAL
MOBILITY SCORE: 12
TURNING FROM BACK TO SIDE WHILE IN FLAT BAD: A LITTLE
SUGGESTED CMS G CODE MODIFIER MOBILITY: CL
MOVING TO AND FROM BED TO CHAIR: A LOT
STANDING UP FROM CHAIR USING ARMS: A LOT
WALKING IN HOSPITAL ROOM: A LOT
CLIMB 3 TO 5 STEPS WITH RAILING: TOTAL
MOVING FROM LYING ON BACK TO SITTING ON SIDE OF FLAT BED: A LOT

## 2023-03-05 ASSESSMENT — FIBROSIS 4 INDEX
FIB4 SCORE: 2.27
FIB4 SCORE: 2.27

## 2023-03-05 ASSESSMENT — PAIN DESCRIPTION - PAIN TYPE: TYPE: ACUTE PAIN

## 2023-03-05 NOTE — ASSESSMENT & PLAN NOTE
- Unclear baseline - pt's NOK is her niece, but apparently they had little contact throughout pt's life and her true baseline is not known  - Pt lives with a caregiver who is not DPOA  - On a day to day basis, pt not able to remember our conversation from yesterday regarding reason for admission, and could not remember discussing the evidence of metastatic disease. She does not show evidence of understanding the consequences of her choices at this time - this can change on a day to day basis, will order a cog eval to help discern pt's underlying issues and needs  - DPOA apparently some person in [place] named Joseline, we do not have contact information for her - we do not have evidence that this paperwork actually exist  - Now there is question as to whether patient may have a brother, Ap (number [phone]) - he was unable to speak with me on the phone today but his wife confirmed that he is the pt's biological brother

## 2023-03-05 NOTE — CARE PLAN
The patient is Watcher - Medium risk of patient condition declining or worsening    Shift Goals  Clinical Goals: heaprin drip, monitor  anti xa, maintain SpO2>90%  Patient Goals: sleep  Family Goals: LIZETH    Progress made toward(s) clinical / shift goals:    Problem: Urinary - Renal Perfusion  Goal: Ability to achieve and maintain adequate renal perfusion and functioning will improve  Outcome: Progressing     Problem: Fall Risk  Goal: Patient will remain free from falls  Outcome: Progressing       Patient is not progressing towards the following goals:      Problem: Respiratory  Goal: Patient will achieve/maintain optimum respiratory ventilation and gas exchange  Outcome: Not Progressing     Patient is still on 3L O2 nasal cannula

## 2023-03-05 NOTE — PROGRESS NOTES
Pt very pleasant, however only oriented x1. Was unable to complete certain parts of admission profile and med rec. Pt did however state she lives at home with a caregiver named Vianca and stated she felt same at home.

## 2023-03-05 NOTE — DISCHARGE PLANNING
Anticipated Discharge Disposition: Unknown    Action: People search done. 1 1st degree relative found Rajan Diallo he is  as of 1998. 3 possible associated  noted Akosua Seals and Earl Quinn but not listed as relative. Facesheet has relative listed. Reviewed with CM , will await further direction. No DPOA noted in this chart or old  chart. Will follow with MD on  POA details to see if can do further search for details.    Barriers to Discharge: unknown    Plan: Floor CM to cont to follow

## 2023-03-05 NOTE — PROGRESS NOTES
Report received from KATE Ac.Phlebo at bedside for lab draw.  Call light and belongings within reach.

## 2023-03-05 NOTE — THERAPY
"Physical Therapy   Initial Evaluation     Patient Name: Jess Diallo  Age:  78 y.o., Sex:  female  Medical Record #: [id number]  Today's Date: 3/5/2023     Precautions  Precautions: Fall Risk  Comments: short of breath    Assessment  Patient is a 78 y.o. female with a diagnosis of Acute hypoxemic respiratory failure and schizophrenia who presented 3/3/2023 with shortness of breath, lower extremity swelling, confusion, and worsening weakness x 1 week.   Pt  reluctant to work with therapy but did eventually agree to OOB.   Pt was unable to supply full PLOF or home situation information but did state she has  a '\"friend\" who is a .   Pt demonstrating decreased activity tolerance, impaired standing balance, generalized weakness. Pt refused to joseph footwear for extended ambulation assessment. Pt will benefit from continued PT while in house. Anticipate pt should be able to return home with caregiver as medical condition improves.  Plan    Physical Therapy Initial Treatment Plan   Treatment Plan : Bed Mobility, Gait Training, Neuro Re-Education / Balance, Therapeutic Activities, Therapeutic Exercise, Stair Training  Treatment Frequency: 3 Times per Week  Duration: Until Therapy Goals Met    DC Equipment Recommendations: Unable to determine at this time  Discharge Recommendations: Recommend home health for continued physical therapy services            Objective       03/05/23 1423   Vitals   O2 (LPM) 3.5   O2 Delivery Device Nasal Cannula   Pain 0 - 10 Group   Therapist Pain Assessment Nurse Notified;Post Activity Pain Same as Prior to Activity;0   Prior Living Situation   Prior Services Continuous (24 Hour) Care Giving Per Service  (friend who resides with patient)   Housing / Facility 1 Story House   Steps Into Home   (pt unable to recall)   Elevator No   Bathroom Set up Bathtub / Shower Combination   Equipment Owned Front-Wheel Walker   Lives with - Patient's Self Care Capacity Attendant / Paid Care Giver "   Prior Level of Functional Mobility   Bed Mobility Independent   Transfer Status Independent   Ambulation Independent   Ambulation Distance household   Assistive Devices Used   (FWW per medical record, however pt denies use of AD.)   Stairs   (unable to determine, questionable historian)   History of Falls   Date of Last Fall   (pt unable to recall)   Cognition    Level of Consciousness Alert   Safety Awareness Impaired   Comments pt refused to wear shoes or sock, educated on fall risk. impulsive with sit to stand.   Active ROM Lower Body    Active ROM Lower Body  WDL   Strength Lower Body   Lower Body Strength  WDL   Sensation Lower Body   Lower Extremity Sensation   Not Tested   Lower Body Muscle Tone   Lower Body Muscle Tone  WDL   Coordination Upper Body   Coordination Not Tested   Vision   Vision Comments no c/o visual changes   Balance Assessment   Sitting Balance (Static) Good   Sitting Balance (Dynamic) Fair +   Standing Balance (Static) Fair   Standing Balance (Dynamic) Fair -   Weight Shift Sitting Fair   Weight Shift Standing Fair   Comments HHA next to side of bed.   Bed Mobility    Supine to Sit Supervised   Sit to Supine Supervised   Scooting Supervised   Rolling Supervised   Gait Analysis   Gait Level Of Assist Contact Guard Assist   Assistive Device Hand Held Assist   Distance (Feet) 3   # of Times Distance was Traveled 2   Deviation Increased Base Of Support;Shuffled Gait   Weight Bearing Status no restrictions   Comments unable to test extended, pt refused to joseph socks or shoes. Required gentle encouragement to even get to EOB.   Functional Mobility   Sit to Stand Contact Guard Assist  (fell posterior onto bed on first attempt to stand,)   Bed, Chair, Wheelchair Transfer Refused   Toilet Transfers Refused   Mobility supine>EOB>  room   Activity Tolerance   Sitting Edge of Bed 10 min   Standing 1 min   Short Term Goals    Short Term Goal # 1 Pt will perform bed mobility at [facility] level by tx  6   Short Term Goal # 2 Pt will transfer with LRAD at SPV level by tx 6   Short Term Goal # 3 Pt will ambulate with LRAD for 75 ft with SPV by tx 6   Education Group   Role of Physical Therapist Patient Response Patient;Acceptance;Explanation;No Learning Evidence   Gait Training Patient Response Family;Acceptance;Explanation;No Learning Evidence

## 2023-03-05 NOTE — CARE PLAN
The patient is Stable - Low risk of patient condition declining or worsening    Shift Goals  Clinical Goals: heparin drip  Patient Goals: Rest  Family Goals: LIZETH    Progress made toward(s) clinical / shift goals:  Heparin gtt    Patient is not progressing towards the following goals:      Problem: Knowledge Deficit - Standard  Goal: Patient and family/care givers will demonstrate understanding of plan of care, disease process/condition, diagnostic tests and medications  Outcome: Progressing

## 2023-03-05 NOTE — PROGRESS NOTES
Charge Nurse Rounding Note    Bedside rounding completed to address quality of care and overall patient experience.    Patient Satisfaction addressed including staff responsiveness. Patient/family are aware of the POC and any questions answered. Thorough safety education completed including use of call light prior to all mobility throughout the entirety of the hospital stay.     Patient/family aware of time of next Hourly Round.

## 2023-03-05 NOTE — PROGRESS NOTES
Telemetry Shift Summary     Rhythm SR, ST  HR Range 80-96, 109  Ectopy oPVC, rPAC  Measurements  0.16/0.08/0.36           Normal Values  Rhythm SR  HR Range    Measurements 0.12-0.20 / 0.06-0.10  / 0.30-0.52
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
## 2023-03-05 NOTE — PROGRESS NOTES
Hospital Medicine Daily Progress Note    Date of Service  3/5/2023    Chief Complaint  Jess Diallo is a 78 y.o. female admitted 3/3/2023 with AMS    Hospital Course  This is a female with a history of bipolar disorder vs schizophrenia, PVD, depression who presented to hospital with SOB and LE edema with concurrent confusion.  On arrival to the ER, she was afebrile, tachycardic and requiring 3L O2. White count was elevated, CTA demonstrated multiple segmental and distal pulmonary emboli and she was found to have pulmonary metastases along with multiple liver masses.  Due to the metastatic lesions seen on CTA, CT a/p was obtained which demonstrated possible adrenal mets, a potential cecal mass, as well as LN mets.     Interval Problem Update  3/4 - Stop antibiotics, no signs of infection on imaging or exam - procal likely elevated from malignancy and pt having diarrhea on antibiotics.  Continue heparin for now, if pt can swallow, transition to Eliquis - SLP pending as nursing has concerns over swallow ability.   Referral placed to Onc as an outpatient, primary likely a cecal mass - could not discuss prognosis or pt today as she was minimally interactive, though pleasant.  IP Palliative consulted.  Pt's closest living relative is her niece, but her niece states someone named Joseline has POA and lives in [place], but is concerned she is taking financial advantage of the patient and is unable to get a hold of her. I have asked her to email me any POA documentation she may have. Checking echo, trop negative, RV:LV ratio is 1.0.     3/5 - Pt SOB with eating, passed her swallow for purees - covert Heparin to Eliquis. Echo with normal RV function. Pt unable to relay to me why she is in the hospital today - did not remember our discussion yesterday regarding PTE and metastatic cancer. Pt will likely need liver biopsy at some point in time if palliative chemo is desired, but pt not able to demonstrate capacity currently. Continue  Eliquis in hospital, check BNP, CM aware that dispo will likely be an issue as pt lives with a caregiver at home and may have increased needs. Consult to PT/OT today. CM aware to do a NOK search - current NOK on file is pt's niece whom she has had very little contact with.     I have discussed this patient's plan of care and discharge plan at IDT rounds today with Case Management, Nursing, Nursing leadership, and other members of the IDT team.    Consultants/Specialty  Palliative    Code Status  Full Code    Disposition  Patient is not medically cleared for discharge.   Anticipate discharge to to skilled nursing facility.  I have placed the appropriate orders for post-discharge needs.    Review of Systems  Review of Systems   Constitutional:  Negative for chills and fever.   Respiratory:  Negative for cough and shortness of breath.    Cardiovascular:  Negative for chest pain and leg swelling.   Gastrointestinal:  Negative for abdominal pain and nausea.   All other systems reviewed and are negative.     Physical Exam  Temp:  [36.6 °C (97.9 °F)-37 °C (98.6 °F)] 36.9 °C (98.5 °F)  Pulse:  [] 79  Resp:  [16-18] 18  BP: (117-135)/(43-72) 134/60  SpO2:  [90 %-97 %] 95 %    Physical Exam  Vitals and nursing note reviewed.   Constitutional:       Appearance: Normal appearance. She is not ill-appearing.   HENT:      Head: Normocephalic and atraumatic.      Mouth/Throat:      Mouth: Mucous membranes are moist.   Cardiovascular:      Rate and Rhythm: Normal rate and regular rhythm.   Pulmonary:      Effort: Pulmonary effort is normal. No respiratory distress.      Breath sounds: Normal breath sounds. No wheezing or rales.   Abdominal:      General: Abdomen is flat. Bowel sounds are normal.      Palpations: Abdomen is soft.   Musculoskeletal:         General: No swelling.   Skin:     General: Skin is warm and dry.   Neurological:      General: No focal deficit present.      Mental Status: She is alert.      Comments:  Unclear intellectual baseline, pt minimally interactive.    Psychiatric:         Mood and Affect: Mood normal.         Behavior: Behavior normal.       Fluids  No intake or output data in the 24 hours ending 03/05/23 1307      Laboratory  Recent Labs     03/03/23 1710 03/04/23 0438 03/05/23  0405   WBC 12.1* 9.9 8.5   RBC 4.51 4.05* 4.04*   HEMOGLOBIN 11.8* 10.5* 10.4*   HEMATOCRIT 37.8 33.7* 33.6*   MCV 83.8 83.2 83.2   MCH 26.2* 25.9* 25.7*   MCHC 31.2* 31.2* 31.0*   RDW 47.2 47.0 46.8   PLATELETCT 323 272 300   MPV 10.1 10.5 10.2       Recent Labs     03/03/23 1710 03/04/23  0438 03/05/23  0405   SODIUM 129* 135 135   POTASSIUM 4.4 3.6 3.6   CHLORIDE 96 103 105   CO2 20 22 19*   GLUCOSE 133* 86 88   BUN 13 10 7*   CREATININE 0.78 0.54 0.52   CALCIUM 8.1* 7.9* 7.6*       Recent Labs     03/03/23 1710   APTT 33.7   INR 1.24*                 Imaging  EC-ECHOCARDIOGRAM COMPLETE W/O CONT   Final Result      CT-ABDOMEN-PELVIS WITH   Final Result      1.  There are multiple bilobar hepatic lesions consistent with metastatic disease.   2.  There are bilateral pulmonary nodules in the lung bases consistent with metastatic disease.   3.  Slightly plump adrenal glands without definitive mass. Metastasis is not excluded.   4.  Abnormal soft tissue in the medial cecum suspicious for potential cecal mass.   5.  There is a necrotic lymph node in the portacaval region suspicious for metastasis.   6.  Distal colonic diverticulosis without diverticulitis.   7.  There is a large hiatal hernia.   8.  Incidental simple renal cyst. No further imaging follow-up per ACR guidelines.      CT-CTA CHEST PULMONARY ARTERY W/ RECONS   Final Result         1.  Bilateral segmental and distal pulmonary emboli.   2.  Innumerable bilateral pulmonary nodules are most in keeping with pulmonary metastases.   3.  Multiple liver masses are concerning for liver metastases.   4.  Right hilar adenopathy which could be related to metastatic disease.       These findings were discussed with STACI PATEL on 3/3/2023 6:37 PM.            CT-HEAD W/O   Final Result      1.  There is no acute intracranial hemorrhage or infarct.      2.  White matter lucencies most consistent with small vessel ischemic change versus demyelination or gliosis.      3.  There is cerebral atrophy.         DX-CHEST-PORTABLE (1 VIEW)   Final Result         1. Low lung volume with hypoventilatory change and bibasilar atelectasis.             Assessment/Plan  * Acute hypoxemic respiratory failure (HCC)- (present on admission)  Assessment & Plan  Likely secondary to pulmonary embolism and metastatic disease.  Therapeutic anticoagulation.  Oxygen as needed, Respiratory protocol, Bronchodilators, Incentive spirometry     Cognitive change  Assessment & Plan  - Unclear baseline - pt's NOK is her niece, but apparently they had little contact throughout pt's life and her true baseline is not known  - Pt lives with a caregiver who is not DPOA  - Pt not able to remember our conversation from yesterday regarding reason for admission, and could not remember discussing the evidence of metastatic disease  - DPOA apparently some person in [place] named Joseline, we do not have contact information for her    Normocytic anemia  Assessment & Plan  - No current s/s of bleeding    Paranoid schizophrenia (HCC)  Assessment & Plan  - Home meds are currently unknown    SIRS (systemic inflammatory response syndrome) (HCC)- (present on admission)  Assessment & Plan  SIRS criteria identified on my evaluation include:  Tachycardia, with heart rate greater than 90 BPM, Tachypnea, with respirations greater than 20 per minute and Leukocytosis, with WBC greater than 12,000  No clear evidence for infection at this point.  SIRS criteria could be related to her metastatic cancer  Started on ceftriaxone in the emergency room, stopped as no evidence of infection    Metastatic disease (HCC)- (present on admission)  Assessment & Plan  -  Likely cecal mass as primary  - Mets to the lungs, liver, adrenals, LNs on imaging  - Referral placed to Oncology, if pt wishes to pursue palliative treatment, will need a tissue biopsy, liver being the most likely target      Encephalopathy acute- (present on admission)  Assessment & Plan  CT scan of the head did not show evidence for acute infarction or hemorrhage  Likely metabolic likely secondary to hypoxemia  Resolved     Elevated liver enzymes- (present on admission)  Assessment & Plan  Likely related to metastatic disease  RESOLVED   Continue to monitor, avoid/minimize hepatotoxins as much as possible.     Hyponatremia- (present on admission)  Assessment & Plan  Resolved     Acute pulmonary embolism (HCC)- (present on admission)  Assessment & Plan  Imaging shows evidence for bilateral segmental and distal pulmonary emboli.  Likely provoked by malignancy   No RV strain on echo, negative trop  Heparin drip - transition to Eliquis  Oxygen as needed, Respiratory protocol, Incentive spirometry          VTE prophylaxis: SCDs/TEDs and therapeutic anticoagulation with Eliquis    I have performed a physical exam and reviewed and updated ROS and Plan today (3/5/2023). In review of yesterday's note (3/4/2023), there are no changes except as documented above.

## 2023-03-05 NOTE — THERAPY
"Speech Language Pathology   Clinical Swallow Evaluation     Patient Name: Jess Diallo  AGE:  78 y.o., SEX:  female  Medical Record #: [id number]  Today's Date: 3/5/2023     Precautions  Precautions: Fall Risk, Swallow Precautions  Comments: short of breath    Assessment    HPI: Pt is a 79 y/o female who presented 3/3/23 with shortness of breath and lower extremity swelling after experiencing progressively worsening generalized weakness and shortness of breath over the last week. Family reports the pt has become increasingly confused. Per ED MD note: \"CT Chest scan came back with multiple findings for likely PEs and possible lung metastatic lesions.  After discussion with the radiologist I will obtain a CT of the abdomen. Patient will require admission for management of her new PEs, her new hypoxia, and further diagnostic work-up for new malignancy of undetermined etiology.\" Per RN, pt appeared to have difficulty breathing while eating yesterday. Diet was modified to pureed solids and thin liquids and pt was provided with 1:1 assistance.     PMHx: PMHx paranoid schizophrenia and bipolar. No history of SLP found in this EMR.    CXR, 3/3/23:   1. Low lung volume with hypoventilatory change and bibasilar atelectasis.    CTA, Chest, 3/3/23:   1.  Bilateral segmental and distal pulmonary emboli.  2.  Innumerable bilateral pulmonary nodules are most in keeping with pulmonary metastases.  3.  Multiple liver masses are concerning for liver metastases.  4.  Right hilar adenopathy which could be related to metastatic disease.    CT Head, 3/3/23:   1.  There is no acute intracranial hemorrhage or infarct.  2.  White matter lucencies most consistent with small vessel ischemic change versus demyelination or gliosis.  3.  There is cerebral atrophy      Level of Consciousness: Alert  Affect/Behavior: Calm, Cooperative  Follows Directives: Yes - simple commands only  Orientation: Self,   Hearing: Functional hearing  Vision: " Functional vision      Prior Living Situation & Level of Function:  Pt reports she lives with a friend who helps her with meals and driving. Pt initially stated she cooked, but then reported her friend helps her. Pt denies coughing/choking with eating or drinking and reported she consumes a regular diet with thin liquids at baseline. Unclear whether pt is a reliable historian.      Oral Mechanism Evaluation  Facial Symmetry: Equal  Facial Sensation:  did not assess  Labial Observations:  slight right asymmetry noted  Lingual Observations:  slight right deviation on prortusion  Dentition: Poor, Multiple carries, Edentulous upper   Comments: pt denies upper denture       Voice  Quality: Breathy continuous  Resonance: WFL  Intensity: Soft, short of breath  Cough: WFL  Comments:      Current Method of Nutrition   Oral diet (puree/thin)      Subjective  Pt was alert, pleasant, and cooperative throughout session. Pt frequently reported feeling short of breath during feeding task.       Assessment  Positioning: Piper's (60-90 degrees)  Bolus Administration: Patient  Oxygen Requirements:  3.5 L Nasal Cannula  Factor(s) Affecting Performance: Impaired endurance, shortness of breath    Swallowing Trials  Ice: WFL  Thin Liquid (TN0): WFL  Liquidised (LQ3): WFL  Pureed (PU4): WFL  Minced & Moist (MM5): Impaired    Comments:  Pt was repositioned to upright with assist from RN and provided with PO trials of ice chips, thin liquids by cup, applesauce, pudding, and crushed humberto cracker in pudding. Pt demonstrated large bites/sips of PO, agreeable to cues for smaller bites/sips. Oral phase marked by adequate bolus stripping from utensil with mildly prolonged bolus manipulation/mastication. Crushed cracker remained in oral cavity, scattered on lower dentition, requiring additional swallow to clear. Impaired coordination of respiration-deglutition noted throughout, with pt complaining of shortness of breath intermittently  throughout session. Pt also noted with abnormal breathing pattern characterized by short gasps and intermittent expiratory wheezing. During these periods, pt cued for slow rate, small bites/sips, and breaks to catch breath, which appeared to reduce/resolve breathing difficulties.        Clinical Impressions  Clinical s/sx dysphagia included difficulty clearing oral cavity of more advanced solids and poor coordination of breath and swallow resulting in pt complaint of shortness of breath and abnormal breathing pattern. With cues for slow rate, small bites/sips, and frequent breaks for shortness of breath, pt demonstrated adequate tolerance of PO, though a modified diet is indicated.       Recommendations  1.  Pureed solids and thin liquids   2.  Instrumentation: Instrumental swallow study pending clinical progress, if needed  3.  Swallowing Instructions & Precautions:   Supervision: Direct supervision during meals  Positioning: Fully upright and midline during oral intake  Medication: Whole with puree  Strategies: Small bites/sips, Slow rate of intake, take frequent breaks for shortness of breath  Oral Care: Q8h  4.   SLP to follow    Plan    Recommend Speech Therapy 3 times per week until therapy goals are met for the following treatments:  Dysphagia Training and Patient / Family / Caregiver Education.    Discharge Recommendations: Other (TBD pending clinical course; HH vs post-acute placement)    Objective       03/05/23 1021   Vitals   O2 (LPM) 3.5   O2 Delivery Device Nasal Cannula   Prior Level Of Function   Communication Unknown  (increased confusion, ? hx dementia per chart)   Swallow Within Functional Limits  (pt reports reg/thin; unclear whether reliable historian)   Dentition Other (See Comments)  (edentulous upper, poor quality lower)   Dentures None   Hearing Within Functional Limits for Evaluation   Hearing Aid None   Vision Wears Corrective Lenses;Within Functional Limits for Evaluation   Patient's  "Primary Language English   Dysphagia Rating   Nutritional Liquid Intake Rating Scale Non thickened beverages   Nutritional Food Intake Rating Scale Total oral diet of a single consistency   Patient / Family Goals   Patient / Family Goal #1 \"I'll have some more\" re: pudding   Short Term Goals   Short Term Goal # 1 Pt will consume PU4/TN0 free from s/sx aspiration or respiratory compromise given moderate cues for safe swallow strategies.       "

## 2023-03-05 NOTE — PROGRESS NOTES
Pt taking very short, shallow breaths concerning this RN throughout meals, she appears to have high risk for aspiration due to work of breathing. MD notified, order for speech eval to be done 3/5/23 and staff to watch pt eat. Due to scattered teeth diet changed to pureed.

## 2023-03-06 ENCOUNTER — APPOINTMENT (OUTPATIENT)
Dept: MEDICAL GROUP | Facility: MEDICAL CENTER | Age: 78
End: 2023-03-06
Payer: MEDICARE

## 2023-03-06 LAB
ANION GAP SERPL CALC-SCNC: 10 MMOL/L (ref 7–16)
BACTERIA UR CULT: NORMAL
BUN SERPL-MCNC: 6 MG/DL (ref 8–22)
CALCIUM SERPL-MCNC: 7.9 MG/DL (ref 8.4–10.2)
CHLORIDE SERPL-SCNC: 106 MMOL/L (ref 96–112)
CO2 SERPL-SCNC: 21 MMOL/L (ref 20–33)
CREAT SERPL-MCNC: 0.49 MG/DL (ref 0.5–1.4)
ERYTHROCYTE [DISTWIDTH] IN BLOOD BY AUTOMATED COUNT: 47.8 FL (ref 35.9–50)
GFR SERPLBLD CREATININE-BSD FMLA CKD-EPI: 96 ML/MIN/1.73 M 2
GLUCOSE SERPL-MCNC: 84 MG/DL (ref 65–99)
HCT VFR BLD AUTO: 36.2 % (ref 37–47)
HGB BLD-MCNC: 10.9 G/DL (ref 12–16)
MCH RBC QN AUTO: 25.9 PG (ref 27–33)
MCHC RBC AUTO-ENTMCNC: 30.1 G/DL (ref 33.6–35)
MCV RBC AUTO: 86 FL (ref 81.4–97.8)
PLATELET # BLD AUTO: 285 K/UL (ref 164–446)
PMV BLD AUTO: 10.3 FL (ref 9–12.9)
POTASSIUM SERPL-SCNC: 4.2 MMOL/L (ref 3.6–5.5)
RBC # BLD AUTO: 4.21 M/UL (ref 4.2–5.4)
SIGNIFICANT IND 70042: NORMAL
SITE SITE: NORMAL
SODIUM SERPL-SCNC: 137 MMOL/L (ref 135–145)
SOURCE SOURCE: NORMAL
WBC # BLD AUTO: 8.2 K/UL (ref 4.8–10.8)

## 2023-03-06 PROCEDURE — 700102 HCHG RX REV CODE 250 W/ 637 OVERRIDE(OP): Performed by: FAMILY MEDICINE

## 2023-03-06 PROCEDURE — 700102 HCHG RX REV CODE 250 W/ 637 OVERRIDE(OP): Performed by: HOSPITALIST

## 2023-03-06 PROCEDURE — A9270 NON-COVERED ITEM OR SERVICE: HCPCS | Performed by: HOSPITALIST

## 2023-03-06 PROCEDURE — 80048 BASIC METABOLIC PNL TOTAL CA: CPT

## 2023-03-06 PROCEDURE — 85027 COMPLETE CBC AUTOMATED: CPT

## 2023-03-06 PROCEDURE — 99232 SBSQ HOSP IP/OBS MODERATE 35: CPT | Performed by: FAMILY MEDICINE

## 2023-03-06 PROCEDURE — 92526 ORAL FUNCTION THERAPY: CPT

## 2023-03-06 PROCEDURE — A9270 NON-COVERED ITEM OR SERVICE: HCPCS | Performed by: FAMILY MEDICINE

## 2023-03-06 PROCEDURE — 36415 COLL VENOUS BLD VENIPUNCTURE: CPT

## 2023-03-06 PROCEDURE — 97530 THERAPEUTIC ACTIVITIES: CPT

## 2023-03-06 PROCEDURE — 770020 HCHG ROOM/CARE - TELE (206)

## 2023-03-06 PROCEDURE — 97535 SELF CARE MNGMENT TRAINING: CPT

## 2023-03-06 PROCEDURE — 97166 OT EVAL MOD COMPLEX 45 MIN: CPT

## 2023-03-06 PROCEDURE — 99407 BEHAV CHNG SMOKING > 10 MIN: CPT

## 2023-03-06 RX ADMIN — APIXABAN 10 MG: 5 TABLET, FILM COATED ORAL at 04:19

## 2023-03-06 RX ADMIN — SENNOSIDES AND DOCUSATE SODIUM 2 TABLET: 50; 8.6 TABLET ORAL at 04:20

## 2023-03-06 RX ADMIN — APIXABAN 10 MG: 5 TABLET, FILM COATED ORAL at 16:39

## 2023-03-06 ASSESSMENT — COGNITIVE AND FUNCTIONAL STATUS - GENERAL
MOVING FROM LYING ON BACK TO SITTING ON SIDE OF FLAT BED: A LOT
DRESSING REGULAR LOWER BODY CLOTHING: A LOT
PERSONAL GROOMING: A LITTLE
TOILETING: A LOT
STANDING UP FROM CHAIR USING ARMS: A LOT
SUGGESTED CMS G CODE MODIFIER DAILY ACTIVITY: CK
EATING MEALS: A LITTLE
SUGGESTED CMS G CODE MODIFIER MOBILITY: CL
DRESSING REGULAR UPPER BODY CLOTHING: A LITTLE
DAILY ACTIVITIY SCORE: 15
HELP NEEDED FOR BATHING: A LOT
MOBILITY SCORE: 13
MOVING TO AND FROM BED TO CHAIR: A LITTLE
TURNING FROM BACK TO SIDE WHILE IN FLAT BAD: A LITTLE
WALKING IN HOSPITAL ROOM: A LOT
CLIMB 3 TO 5 STEPS WITH RAILING: TOTAL

## 2023-03-06 ASSESSMENT — ACTIVITIES OF DAILY LIVING (ADL): TOILETING: INDEPENDENT

## 2023-03-06 ASSESSMENT — GAIT ASSESSMENTS
DISTANCE (FEET): 4
GAIT LEVEL OF ASSIST: REFUSED

## 2023-03-06 ASSESSMENT — ENCOUNTER SYMPTOMS
SHORTNESS OF BREATH: 0
COUGH: 0
ABDOMINAL PAIN: 0
CHILLS: 0
NAUSEA: 0
FEVER: 0

## 2023-03-06 ASSESSMENT — LIFESTYLE VARIABLES: PACK_YEARS: 10+

## 2023-03-06 NOTE — THERAPY
Speech Language Pathology  Daily Treatment     Patient Name: Jess Diallo  Age:  78 y.o., Sex:  female  Medical Record #: [id number]  Today's Date: 3/6/2023     Precautions: Fall Risk  Comments: short of breath    Assessment  Patient seen on this date for dysphagia treatment w/ pureed and thin liquid meal tray. Pt noted to have mild WOB that was the same before, during, and after PO. Ground sausage on tray despite diet order for purees. Pt had mild wet vocal quality prior to session but was cleared with cued cough.     PO consisted of ~8 oz thins via single and consecutive sips from cup as well as minced and soft solids textures. NO overt s/sx of aspiration with trials of thin liquids, vocal quality remained clear. However, did have inconsistent belching response following thin liquids concerning for esophageal dysfunction and/or reflux. Pt denied hx of GERD and does not see GI outpt. Moderate diffuse oral residue following ground meat which was cleared with liquid wash. Prolonged mastication and delayed, dry cough x1 following larger bite of soft solids.     Recommendations  1.  Continue pureed solids and thin liquids   2.  Instrumentation: Instrumental swallow study pending clinical progress, if needed  3.  Swallowing Instructions & Precautions:   Supervision: Direct supervision during meals  Positioning: Fully upright and midline during oral intake  Medication: Whole with puree  Strategies: Small bites/sips, Slow rate of intake, take frequent breaks for shortness of breath, reflux precautions  Oral Care: Q8h  4. Consider esophagram or f/u w/ GI (either inpatient or outpatient)      Plan  Continue current treatment plan.    Discharge Recommendations: Recommend home health for continued speech therapy services       03/06/23 0830   Vitals   O2 (LPM) 3   O2 Delivery Device Nasal Cannula   Short Term Goals   Short Term Goal # 1 Pt will consume PU4/TN0 free from s/sx aspiration or respiratory compromise given moderate  cues for safe swallow strategies.   Goal Outcome # 1 Progressing as expected

## 2023-03-06 NOTE — PROGRESS NOTES
Call placed to patient's PCP for complete medication reconciliation. No answer, voicemail left with callback request.

## 2023-03-06 NOTE — PROGRESS NOTES
Telemetry Shift Summary     Rhythm SR  HR Range 79-85  Ectopy o-rPVC,rCoup, rPAC  Measurements  0.14/0.08/0.36           Normal Values  Rhythm SR  HR Range    Measurements 0.12-0.20 / 0.06-0.10  / 0.30-0.52

## 2023-03-06 NOTE — CARE PLAN
The patient is Stable - Low risk of patient condition declining or worsening    Shift Goals  Clinical Goals: safety, maintain SpO2 >90%  Patient Goals: sleep  Family Goals: LIZETH    Progress made toward(s) clinical / shift goals:    Problem: Fall Risk  Goal: Patient will remain free from falls  Outcome: Progressing       Patient is not progressing towards the following goals:      Problem: Respiratory  Goal: Patient will achieve/maintain optimum respiratory ventilation and gas exchange  Outcome: Not Progressing   Patient is still on 3L O2 nasal cannula

## 2023-03-06 NOTE — THERAPY
Physical Therapy   Daily Treatment     Patient Name: Jess Diallo  Age:  78 y.o., Sex:  female  Medical Record #: [id number]  Today's Date: 3/6/2023     Precautions  Precautions: Fall Risk  Comments: SOB    Assessment    Pts caregiver present for session and confirms that pt was ambulatory in home without an AD, uses 4WW when they go on walks.  Today, pt limited by c/o SOB with activity, refusing to transfer to chair or ambulate. Sats > 90% with activity on 3L nc. Recommend SNF for further strengthening.     Plan    Treatment Plan Status:    Type of Treatment: Bed Mobility, Gait Training, Neuro Re-Education / Balance, Therapeutic Activities, Therapeutic Exercise, Stair Training  Treatment Frequency: 3 Times per Week  Treatment Duration: Until Therapy Goals Met    DC Equipment Recommendations: Unable to determine at this time  Discharge Recommendations: Recommend post-acute placement for additional physical therapy services prior to discharge home       03/06/23 1036   Cognition    Comments Pt agreeable for PT, c/o feeling SOB. Pts caregiver present to answer questions re: PLOF   Balance   Sitting Balance (Static) Fair +   Sitting Balance (Dynamic) Fair   Standing Balance (Static) Fair   Standing Balance (Dynamic) Fair -   Weight Shift Sitting Fair   Weight Shift Standing Fair   Skilled Intervention Postural Facilitation   Comments stdg with FWW   Bed Mobility    Supine to Sit Standby Assist   Sit to Supine Standby Assist   Gait Analysis   Gait Level Of Assist Refused   Comments Pt reported feeling too short of breath to ambulate   Functional Mobility   Sit to Stand Contact Guard Assist   Bed, Chair, Wheelchair Transfer Refused   Activity Tolerance   Sitting Edge of Bed 10 min   Standing 1 min   Comments O2 sats greater than 90% with mobility on 3L nc   Short Term Goals    Short Term Goal # 1 Pt will perform bed mobility at SPV level by tx 6   Goal Outcome # 1 Progressing slower than expected   Short Term Goal # 2 Pt  will transfer with LRAD at SPV level by tx 6   Goal Outcome # 2 Progressing slower than expected   Short Term Goal # 3 Pt will ambulate with LRAD for 75 ft with SPV by tx 6   Goal Outcome # 3 Progressing slower than expected

## 2023-03-06 NOTE — PROGRESS NOTES
Report received from [facility]. Patient is asleep. Call light and belongings within reach. Bed alarm activated.

## 2023-03-06 NOTE — PROGRESS NOTES
Hospital Medicine Daily Progress Note    Date of Service  3/6/2023    Chief Complaint  Jess Diallo is a 78 y.o. female admitted 3/3/2023 with AMS    Hospital Course  This is a female with a history of bipolar disorder vs schizophrenia, PVD, depression who presented to hospital with SOB and LE edema with concurrent confusion.  On arrival to the ER, she was afebrile, tachycardic and requiring 3L O2. White count was elevated, CTA demonstrated multiple segmental and distal pulmonary emboli and she was found to have pulmonary metastases along with multiple liver masses.  Due to the metastatic lesions seen on CTA, CT a/p was obtained which demonstrated possible adrenal mets, a potential cecal mass, as well as LN mets.     Interval Problem Update  3/4 - Stop antibiotics, no signs of infection on imaging or exam - procal likely elevated from malignancy and pt having diarrhea on antibiotics.  Continue heparin for now, if pt can swallow, transition to Eliquis - SLP pending as nursing has concerns over swallow ability.   Referral placed to Onc as an outpatient, primary likely a cecal mass - could not discuss prognosis or pt today as she was minimally interactive, though pleasant.  IP Palliative consulted.  Pt's closest living relative is her niece, but her niece states someone named Joseline has POA and lives in [place], but is concerned she is taking financial advantage of the patient and is unable to get a hold of her. I have asked her to email me any POA documentation she may have. Checking echo, trop negative, RV:LV ratio is 1.0.     3/5 - Pt SOB with eating, passed her swallow for purees - covert Heparin to Eliquis. Echo with normal RV function. Pt unable to relay to me why she is in the hospital today - did not remember our discussion yesterday regarding PTE and metastatic cancer. Pt will likely need liver biopsy at some point in time if palliative chemo is desired, but pt not able to demonstrate capacity currently. Continue  Eliquis in hospital, check BNP, CM aware that dispo will likely be an issue as pt lives with a caregiver at home and may have increased needs. Consult to PT/OT today. CM aware to do a NOK search - current NOK on file is pt's niece whom she has had very little contact with.     3/6- Pt requiring 3L at baseline, unclear how much contribution from PTE vs pulmonary mets.  Discussed with PT/OT, now recommending SNF - order placed.  Pt is amenable to SNF.  Discussed at length with pt's niece, caregiver (Vianca), and attempted to call pt's reported brother Ap. There is a lot of confusion regarding pt's family situation - the pt currently does not have capacity, NOK has not clearly been established, but at this point, is sounds like Ap is brother by blood. I did attempt to call him today but he was going to his Radiation therapy. There is no paperwork to confirm that this Joseline character has DPOA. At this point, pt and niece both agree that SNF is appropriate, whether she decides to pursue biopsy and palliative chemo can be decided as an outpatient as Onc referral has been placed. SNF referrals to occur today.    I have discussed this patient's plan of care and discharge plan at IDT rounds today with Case Management, Nursing, Nursing leadership, and other members of the IDT team.    Consultants/Specialty  Palliative    Code Status  Full Code    Disposition  Patient is not medically cleared for discharge.   Anticipate discharge to to skilled nursing facility.  I have placed the appropriate orders for post-discharge needs.    Review of Systems  Review of Systems   Constitutional:  Negative for chills and fever.   Respiratory:  Negative for cough and shortness of breath.    Cardiovascular:  Negative for chest pain and leg swelling.   Gastrointestinal:  Negative for abdominal pain and nausea.   All other systems reviewed and are negative.     Physical Exam  Temp:  [36.7 °C (98 °F)-37.1 °C (98.8 °F)] 36.7 °C (98  °F)  Pulse:  [83-96] 90  Resp:  [16-27] 27  BP: (108-142)/(46-73) 108/70  SpO2:  [89 %-97 %] 95 %    Physical Exam  Vitals and nursing note reviewed.   Constitutional:       Appearance: Normal appearance. She is not ill-appearing.   HENT:      Head: Normocephalic and atraumatic.      Mouth/Throat:      Mouth: Mucous membranes are moist.   Cardiovascular:      Rate and Rhythm: Normal rate and regular rhythm.   Pulmonary:      Effort: Pulmonary effort is normal. No respiratory distress.      Breath sounds: Normal breath sounds. No wheezing or rales.   Abdominal:      General: Abdomen is flat. Bowel sounds are normal.      Palpations: Abdomen is soft.   Musculoskeletal:         General: No swelling.   Skin:     General: Skin is warm and dry.   Neurological:      General: No focal deficit present.      Mental Status: She is alert.      Comments: Unclear intellectual baseline, pt minimally interactive.    Psychiatric:         Mood and Affect: Mood normal.         Behavior: Behavior normal.       Fluids    Intake/Output Summary (Last 24 hours) at 3/6/2023 1255  Last data filed at 3/6/2023 0426  Gross per 24 hour   Intake --   Output 1700 ml   Net -1700 ml         Laboratory  Recent Labs     03/04/23  0438 03/05/23  0405 03/06/23  0233   WBC 9.9 8.5 8.2   RBC 4.05* 4.04* 4.21   HEMOGLOBIN 10.5* 10.4* 10.9*   HEMATOCRIT 33.7* 33.6* 36.2*   MCV 83.2 83.2 86.0   MCH 25.9* 25.7* 25.9*   MCHC 31.2* 31.0* 30.1*   RDW 47.0 46.8 47.8   PLATELETCT 272 300 285   MPV 10.5 10.2 10.3       Recent Labs     03/04/23  0438 03/05/23  0405 03/06/23  0233   SODIUM 135 135 137   POTASSIUM 3.6 3.6 4.2   CHLORIDE 103 105 106   CO2 22 19* 21   GLUCOSE 86 88 84   BUN 10 7* 6*   CREATININE 0.54 0.52 0.49*   CALCIUM 7.9* 7.6* 7.9*       Recent Labs     03/03/23  1710   APTT 33.7   INR 1.24*                 Imaging  EC-ECHOCARDIOGRAM COMPLETE W/O CONT   Final Result      CT-ABDOMEN-PELVIS WITH   Final Result      1.  There are multiple bilobar  hepatic lesions consistent with metastatic disease.   2.  There are bilateral pulmonary nodules in the lung bases consistent with metastatic disease.   3.  Slightly plump adrenal glands without definitive mass. Metastasis is not excluded.   4.  Abnormal soft tissue in the medial cecum suspicious for potential cecal mass.   5.  There is a necrotic lymph node in the portacaval region suspicious for metastasis.   6.  Distal colonic diverticulosis without diverticulitis.   7.  There is a large hiatal hernia.   8.  Incidental simple renal cyst. No further imaging follow-up per ACR guidelines.      CT-CTA CHEST PULMONARY ARTERY W/ RECONS   Final Result         1.  Bilateral segmental and distal pulmonary emboli.   2.  Innumerable bilateral pulmonary nodules are most in keeping with pulmonary metastases.   3.  Multiple liver masses are concerning for liver metastases.   4.  Right hilar adenopathy which could be related to metastatic disease.      These findings were discussed with STACI PATEL on 3/3/2023 6:37 PM.            CT-HEAD W/O   Final Result      1.  There is no acute intracranial hemorrhage or infarct.      2.  White matter lucencies most consistent with small vessel ischemic change versus demyelination or gliosis.      3.  There is cerebral atrophy.         DX-CHEST-PORTABLE (1 VIEW)   Final Result         1. Low lung volume with hypoventilatory change and bibasilar atelectasis.             Assessment/Plan  * Acute hypoxemic respiratory failure (HCC)- (present on admission)  Assessment & Plan  Likely secondary to pulmonary embolism and metastatic disease.  Therapeutic anticoagulation.  Oxygen as needed, Respiratory protocol, Bronchodilators, Incentive spirometry     Cognitive change  Assessment & Plan  - Unclear baseline - pt's NOK is her niece, but apparently they had little contact throughout pt's life and her true baseline is not known  - Pt lives with a caregiver who is not DPOA  - On a day to day basis, pt  not able to remember our conversation from yesterday regarding reason for admission, and could not remember discussing the evidence of metastatic disease. She does not show evidence of understanding the consequences of her choices at this time - this can change on a day to day basis, will order a cog eval to help discern pt's underlying issues and needs  - DPOA apparently some person in [place] named Joseline, we do not have contact information for her - we do not have evidence that this paperwork actually exist  - Now there is question as to whether patient may have a brother, Ap (number [phone]) - he was unable to speak with me on the phone today but his wife confirmed that he is the pt's biological brother    Normocytic anemia  Assessment & Plan  - No current s/s of bleeding    Paranoid schizophrenia (HCC)  Assessment & Plan  - Home meds are currently unknown  - CM spoke with pts APRN who advised pt was on Geodon as an outpatient but that she was sedated on the same   - Nursing attempting to get home MAR   - Currently, pt is without any paranoid delusions, is pleasant and cooperative - await MAR to further dictate medications    SIRS (systemic inflammatory response syndrome) (HCC)- (present on admission)  Assessment & Plan  SIRS criteria identified on my evaluation include:  Tachycardia, with heart rate greater than 90 BPM, Tachypnea, with respirations greater than 20 per minute and Leukocytosis, with WBC greater than 12,000  No clear evidence for infection at this point.  SIRS criteria could be related to her metastatic cancer  Started on ceftriaxone in the emergency room, stopped as no evidence of infection    Metastatic disease (HCC)- (present on admission)  Assessment & Plan  - Likely cecal mass as primary  - Mets to the lungs, liver, adrenals, LNs on imaging  - Referral placed to Oncology, if pt wishes to pursue palliative treatment, will need a tissue biopsy, liver being the most likely target  -  Palliative consulted      Encephalopathy acute- (present on admission)  Assessment & Plan  CT scan of the head did not show evidence for acute infarction or hemorrhage  Likely metabolic likely secondary to hypoxemia  Resolved     Elevated liver enzymes- (present on admission)  Assessment & Plan  Likely related to metastatic disease  RESOLVED   Continue to monitor, avoid/minimize hepatotoxins as much as possible.     Hyponatremia- (present on admission)  Assessment & Plan  Resolved     Acute pulmonary embolism (HCC)- (present on admission)  Assessment & Plan  Imaging shows evidence for bilateral segmental and distal pulmonary emboli.  Likely provoked by malignancy   No RV strain on echo, negative trop  Heparin drip - transitioned to Eliquis  Oxygen as needed, Respiratory protocol, Incentive spirometry          VTE prophylaxis: SCDs/TEDs and therapeutic anticoagulation with Eliquis    I have performed a physical exam and reviewed and updated ROS and Plan today (3/6/2023). In review of yesterday's note (3/5/2023), there are no changes except as documented above.

## 2023-03-06 NOTE — THERAPY
"Occupational Therapy   Initial Evaluation     Patient Name: Jess Diallo  Age:  78 y.o., Sex:  female  Medical Record #: [id number]  Today's Date: 3/6/2023     Precautions  Precautions: Fall Risk  Comments: SOB    Assessment  Patient is 78 y.o. female presented 3/3/2023 with shortness of breath, lower extremity swelling, confusion, and worsening weakness x 1 week.  Found to have Hypoxia, Confusion Acute congestive heart failure Shortness of breath Leg swelling Tachycardia  SIRS, PE's, possible malignancy.  Pt with h/o schizophrenia, has a full time caregiver.  Per caregiver, Elena, pt is normally ambulatory in the home without a device and can do most of her simple self care tasks on her own.  Elena helps intermittently with self care, and does most all IADL's, transportation etc.  Pt uses 4ww when out in the community.  Pt currently on 3L O2, gets more SOB (small short breaths appear more due to anxiety than respiratory distress) with activity.  Pt agreeable to stand 2x and then transfer to chair.  She was unable/unwilling to try any walking due to her feeling of being more SOB.  At this time, pt does not demo the activity tolerance needed to get her safely into the home or to get from room to room for ADL's.  She will benefit from further inpt post acute therapy prior to home to increase safety and independence with ADL's.  OT will follow while in house.      Plan    Occupational Therapy Initial Treatment Plan   Treatment Interventions: Self Care / Activities of Daily Living, Neuro Re-Education / Balance, Therapeutic Exercises, Therapeutic Activity, Adaptive Equipment  Treatment Frequency: 3 Times per Week  Duration: Until Therapy Goals Met    DC Equipment Recommendations: Unable to determine at this time  Discharge Recommendations: Recommend post-acute placement for additional occupational therapy services prior to discharge home     Subjective    \"I can't breathe.\"     Objective       03/06/23 1013   Prior Living " Situation   Prior Services Continuous (24 Hour) Care Giving Per Service   Housing / Facility 1 Story House   Steps Into Home 5   Steps In Home 0   Rail Right Rail (Steps into Home)   Bathroom Set up Bathtub / Shower Combination;Shower Chair;Grab Bars   Equipment Owned Front-Wheel Walker;4-Wheel Walker;Tub / Shower Seat;Grab Bar(s) In Tub / Shower;Bed Side Commode   Lives with - Patient's Self Care Capacity Attendant / Paid Care Giver   Comments Pt has caregiver that lives in the home with her   Prior Level of ADL Function   Self Feeding Independent   Grooming / Hygiene Independent   Bathing Requires Assist   Dressing Requires Assist   Toileting Independent   Prior Level of IADL Function   Medication Management Requires Assist   Laundry Requires Assist   Kitchen Mobility Requires Assist   Finances Requires Assist   Home Management Requires Assist   Shopping Requires Assist   Prior Level Of Mobility Supervision With Device in Community;Supervision Without Device in Home   Driving / Transportation Relatives / Others Provide Transportation   Occupation (Pre-Hospital Vocational) Not Employed   Leisure Interests Unable To Determine At This Time   Precautions   Precautions Fall Risk   Comments SOB   Vitals   Pulse Oximetry 95 %   O2 (LPM) 3   O2 Delivery Device Nasal Cannula   Pain 0 - 10 Group   Therapist Pain Assessment 0;During Activity;Nurse Notified   Cognition    Cognition / Consciousness X   Speech/ Communication Delayed Responses   Level of Consciousness Alert   Safety Awareness Impaired   Comments quiet, delayed responses, flattened affect.  Pt answered questions when asked, however caregiver was at bedside and reports pt's answers were not all accurate. Pt agreeable to therapy, donning socks etc.  Appears anxious melany with feeling SOB during activity   Active ROM Upper Body   Active ROM Upper Body  WDL   Dominant Hand Right   Strength Upper Body   Upper Body Strength  X   Gross Strength Generalized Weakness, Equal  Bilaterally.    Coordination Upper Body   Comments grossly intact BUE   Balance Assessment   Sitting Balance (Static) Good   Sitting Balance (Dynamic) Fair +   Standing Balance (Static) Fair   Standing Balance (Dynamic) Fair -   Weight Shift Sitting Fair   Weight Shift Standing Fair   Comments CGA with FWW in standing   Bed Mobility    Supine to Sit Standby Assist   Scooting Supervised   ADL Assessment   Lower Body Dressing Minimal Assist  (socks)   Toileting   (NT- briefs and purewik, not yet able to walk to the bathroom)   How much help from another person does the patient currently need...   Putting on and taking off regular lower body clothing? 2   Bathing (including washing, rinsing, and drying)? 2   Toileting, which includes using a toilet, bedpan, or urinal? 2   Putting on and taking off regular upper body clothing? 3   Taking care of personal grooming such as brushing teeth? 3   Eating meals? 3   6 Clicks Daily Activity Score 15   Functional Mobility   Sit to Stand Contact Guard Assist   Bed, Chair, Wheelchair Transfer Contact Guard Assist   Toilet Transfers Unable to Participate   Transfer Method Stand Step   Mobility stood, transferred to chair at bedside with FWW   Distance (Feet) 4   # of Times Distance was Traveled 1   Comments used FWW   Visual Perception   Visual Perception  WDL   Activity Tolerance   Sitting Edge of Bed 10 min   Standing 45 sec, 2 min.   Comments self limiting with c/o SOB, short small puffs of breathing, unable to sequence pursed lip breathing   Patient / Family Goals   Patient / Family Goal #1 u/a to state   Short Term Goals   Short Term Goal # 1 Pt will dress with CGA from seated in 4 visits   Short Term Goal # 2 pt will stand 8 min at sink with CGA to groom in 4 visits   Short Term Goal # 3 pt will toilet in BR with CGA in 5 visits   Education Group   Education Provided Role of Occupational Therapist   Role of Occupational Therapist Patient Response  Patient;Caregiver;Acceptance;Explanation;Verbal Demonstration   Adaptive Equipment Patient Response Caregiver;Acceptance;Explanation;Demonstration;Verbal Demonstration   Additional Comments Educated Caregiver Elena on resources for AE (Care Chest to help with obtaining wheelchair, incontinence supplies, etc) provided with phone number and website to fill out Care Chest application.  Also educated on process for selecting SNF to include being given a list that would help Elena identify which facilities are closest to home to help her save on gas money visiting patient and bringing her clothing etc while at SNF.  Encouraged her to visit medicare website to research facilities if she so desired

## 2023-03-06 NOTE — DISCHARGE PLANNING
Care Transition Team Discharge Planning          SW given update from Mayda/KATE/MARANDA and am huddle team.  Patient admitted for lower extremity swelling and SOB.  There are several family.friend that have been identified. Some with no contact #.  Patient has history of bipolar d/c and possibly Paranoid Schizophrenia.  SW called ABDIRAHMAN Mir at [facility] who reports that a person kvng Davison [phone] is caregiver and brings patient in for all of her appointments.  Plan was to wean patient off Geodon due to its negative side effects in which patient was somewhat lethargic and not able to fully interact.  Other person whom in family medicine chart is: Christie Rubin: [phone].  Both Vianca Davison and Christie Rubin are permitted to receive medical information about patient. No other persons listed that can receive info.  No Legal POA or Guardian in medical record.  Patients address: [facility] [address] [place]  Patients PH: [phone]  Abdirahman and other staff have not seen any evidence of financial exploitation or abuse.  SW will continue to assist with any d/c planning needs.         Discharge Plan:  Return home with possible HHC, if needed vs SNF.

## 2023-03-06 NOTE — RESPIRATORY CARE
"   COPD EDUCATION by COPD CLINICAL EDUCATOR  3/6/2023 at 12:07 PM by Razia Katz RRT     Patient reviewed by COPD education team. Patient does not have a history or diagnosis of COPD. Patient is a smoker, smoking cessation education done. A comprehensive packet with tips to quit and information on outpatient classes given to patient.      Smoking Cessation Intervention and education completed, 8 minutes spent on smoking cessation education with patient.  Provided smoking cessation packet with \"Tips to Quit\" and brochure for \"Free Smoking Cessation Classes\".      COPD Screen  COPD Risk Screening  Do you have a history of COPD?: No  COPD Population Screener  During the past 4 weeks, how much did you feel short of breath?: Some of the time  Do you ever cough up any mucus or phlegm?: No/only with occasional colds or infections  In the past 12 months, you do less than you used to because of your breathing problems: Agree  Have you smoked at least 100 cigarettes in your entire life?: Yes  How old are you?: 60+  COPD Screening Score: 6  COPD Coordinator Not Recommended: Yes    COPD Assessment  COPD Clinical Specialists ONLY  COPD Education Initiated: Yes--Short Intervention (Smoking Cessation Education provided pt smokes about 3 cigs a day, pt was cooperative and answered every question yes.. PT hx of bipolar disorder vs schizophrenia, PVD, depression,presented to hospital w/SOB and LE edema with concurrent confusion.)  DME Company: none  Physician Follow Up Appointment: 03/17/23  Appt Time: 0825  Physician Name: ELIZABETH Retana  Pulmonologist Name: none  Referrals Initiated: Yes  Pulmonary Rehab: N/A  Smoking Cessation: Yes  $ Smoking Cessation >10 Minutes: Symptomatic  Smoking Pack Years: 10+  Hospice: N/A  Home Health Care: N/A  [facility] Outreach: N/A  [facility] Group: N/A  [facility]: N/A  Private In-Home Care Agency: N/A  Is this a COPD exacerbation patient?: No    PFT " "Results    No results found for: PFT    Meds to Beds  Would the patient like to opt in for Bedside Medication Delivery at Discharge?: No     MY COPD ACTION PLAN     It is recommended that patients and physicians /healthcare providers complete this action plan together. This plan should be discussed at each physician visit and updated as needed.    The green, yellow and red zones show groups of symptoms of COPD. This list of symptoms is not comprehensive, and you may experience other symptoms. In the \"Actions\" column, your healthcare provider has recommended actions for you to take based on your symptoms.    Patient Name: Jess Diallo   YOB: 1945   Last Updated on:     Green Zone:  I am doing well today Actions     Usual activitiy and exercise level   Take daily medications     Usual amounts of cough and phlegm/mucus   Use oxygen as prescribed     Sleep well at night   Continue regular exercise/diet plan     Appetite is good   At all times avoid cigarette smoke, inhaled irritants     Daily Medications (these medications are taken every day):                Yellow Zone:  I am having a bad day or a COPD flare Actions     More breathless than usual   Continue daily medications     I have less energy for my daily activities   Use quick relief inhaler as ordered     Increased or thicker phlegm/mucus   Use oxygen as prescribed     Using quick relief inhaler/nebulizer more often   Get plenty of rest     Swelling of ankles more than usual   Use pursed lip breathing     More coughing than usual   At all times avoid cigarette smoke, inhaled irritants     I feel like I have a \"chest cold\"     Poor sleep and my symptoms woke me up     My appetite is not good     My medicine is not helping      Call provider immediately if symptoms don’t improve     Continue daily medications, add rescue medications:               Medications to be used during a flare up, (as Discussed with Provider):              Red Zone:  I need " urgent medical care Actions     Severe shortness of breath even at rest   Call 911 or seek medical care immediately     Not able to do any activity because of breathing      Fever or shaking chills      Feeling confused or very drowsy       Chest pains      Coughing up blood

## 2023-03-06 NOTE — CONSULTS
Reason for PC Consult: Advance Care Planning    Consulted by: Dr. Chew    Assessment:  General: Jess is a 78 y.o. female with PMHx of schizophrenia, bipolar disorder, PVD and depression who presented to the ED 3/3/23 with worsening shortness of breath, BLE edema, and altered mental status. CTA demonstrated mutliple segmental an distal pulmonary emboli as well as pulmonary metastases and liver masses. CT a/p revealed possible adrenal, cecal, lymph node masses.     Social: Jess lives in [place] with her caregiver, Bhavya. Bhavya states that at first the Jess required assistance with bathing and dressing due to her shortness of breath;however, she has been required more assistance. She ambulates with a FWW. She enjoys watching TV. She was  in the past (her ex  )and has no children. She has a living brother named Ap and multiple step-siblings. Her niece's name is Christie.     Consults: none    Dyspnea: Yes- Pt complaing of dyspnea after OOB to chair with PT  Last BM: 23-    Pain: No-    Depression: Mood appropriate for situation-    Dementia: Unable to Determine;       Spiritual:  Is Faith or spirituality important for coping with this illness? No    Has a  or spiritual provider visit been requested? No    Palliative Performance Scale: 50%    Advance Directive: None on file.     DPOA: No-    POLST: No      Code Status: Full.  Addressed at this encounter with pt. Described the measures employed in a full code (including CPR, medications, and possibly defibrillation) and survival statistics.  Further explained that a DNR would not preclude any treatments/interventions offered to pt. Expressed concern that due to patient's acute situation, progressive disease, age, and co-morbidities, she is unlikely to endure invasive code measures well, and even if she was successfully resuscitated, she may come out of it with significantly reduced quality of life from even his current state.   Ap verbalized understanding; however, he wishes that patient  to remain Full Code at this time. He will discuss with family as he has never discussed healthcare wishes with Jess.       Outcome:  0926: Consult received and EMR reviewed; case discussed with Dr. Chew, updates appreciated.     1008: PC RN to patient's bedside along with caregiver, Bhavya. Introduced self and role of Palliative Care. Patient sitting up in chair. Bhavya shared social history that she knew. PC RN asked Jess if she had a brother named Ap and she said she did. She gave permission to discuss her care with him. She also endorses having a niece named Christie.     1154: Case discussed with Dr. Chew including family/NOK.     1232: PC RN placed phone call to Ap Chung (patient's brother). Introduced self and role of Palliative Care. Ap confirmed he was Jess's only living sibling and shared her nieces name is Christie. He shared that she also has multiple step siblings. He states that he has spoken with hospital staff and with Christie about Jess's health. Discussed possible cancer and need for biopsy and possible Palliative chemo per Dr. Chew. Answered all questions. Discussed code status (see above). Ap shared that he has never discussed healthcare wishes with Jess. Ap would like to discuss further with some other family members. He asked to speak with a Physician and PC RN to reach out to MD. Provided PC office number.     9031: PC RN placed phone call to Dr. Chew. She just spoke with Ap; however, he was leaving the house so, she will try again later.      Active listening, reflection, reminiscing, validation & normalization, empathic support and therapeutic touch utilized throughout this encounter.  All questions answered.  PC contact information given. Acknowledged pt's and family's courage in having this discussion. They expressed appreciation for support.    Updated: MD    Plan: Palliative care to continue to  follow, provide support, and help facilitate decision-making as clinical picture evolves.    Thank you for allowing Palliative Care to participate in this patient's care. Please feel free to call [phone] with any questions or concerns.

## 2023-03-06 NOTE — DISCHARGE PLANNING
Care Transition Team Discharge Planning          MARTA met with patient at bedside and spoke with Vianca TORRES on phone.  DME: [employer], HOME 02  Vianca confirmed that she lives with patient on mobile home.  Patient has previously applied for medicaid and did not qualify.  She makes $1900.90 per month.  MARTA encouraged Vianca to assist in applying again for medicaid.  Vianca reports that at home patient is able to ambulate with walker by self.  Pt/ot recommending SNF for rehab.  Patient does not have capacity to make medical decisions therefore she will not be able to complete any POA or advance directive.   No POA., no advance directive, no guardian.  Vianca reports that she has spoken to Fide and they seem to be in agreement with decisions related to patient.  Fide may be on her way to [place].  Vianca in agreement for SNF and requesting SNF closeby to [place].  According to [facility] law, The following people can make decisions in order:Spouse, an adult child, parents of the patient,adult sibling, nearest other adult relative by blood if these individuals are agreeing to make decisions.      Choice completed and sent to DPA.  Will need PASSR and may be level II and will need reviewed by the state  Will need COVID PCR 24 -48 hrs prior to d/c.  Will continue to assist with d/c planning needs.         Discharge Plan:  SNF

## 2023-03-07 LAB
ANION GAP SERPL CALC-SCNC: 8 MMOL/L (ref 7–16)
BASOPHILS # BLD AUTO: 0.6 % (ref 0–1.8)
BASOPHILS # BLD: 0.05 K/UL (ref 0–0.12)
BUN SERPL-MCNC: 7 MG/DL (ref 8–22)
CALCIUM SERPL-MCNC: 8.1 MG/DL (ref 8.4–10.2)
CHLORIDE SERPL-SCNC: 107 MMOL/L (ref 96–112)
CO2 SERPL-SCNC: 23 MMOL/L (ref 20–33)
CREAT SERPL-MCNC: 0.53 MG/DL (ref 0.5–1.4)
EOSINOPHIL # BLD AUTO: 0.27 K/UL (ref 0–0.51)
EOSINOPHIL NFR BLD: 3.4 % (ref 0–6.9)
ERYTHROCYTE [DISTWIDTH] IN BLOOD BY AUTOMATED COUNT: 47.8 FL (ref 35.9–50)
GFR SERPLBLD CREATININE-BSD FMLA CKD-EPI: 95 ML/MIN/1.73 M 2
GLUCOSE SERPL-MCNC: 83 MG/DL (ref 65–99)
HCT VFR BLD AUTO: 37.8 % (ref 37–47)
HGB BLD-MCNC: 11.5 G/DL (ref 12–16)
IMM GRANULOCYTES # BLD AUTO: 0.03 K/UL (ref 0–0.11)
IMM GRANULOCYTES NFR BLD AUTO: 0.4 % (ref 0–0.9)
LYMPHOCYTES # BLD AUTO: 1.2 K/UL (ref 1–4.8)
LYMPHOCYTES NFR BLD: 15.2 % (ref 22–41)
MCH RBC QN AUTO: 25.9 PG (ref 27–33)
MCHC RBC AUTO-ENTMCNC: 30.4 G/DL (ref 33.6–35)
MCV RBC AUTO: 85.1 FL (ref 81.4–97.8)
MONOCYTES # BLD AUTO: 0.79 K/UL (ref 0–0.85)
MONOCYTES NFR BLD AUTO: 10 % (ref 0–13.4)
NEUTROPHILS # BLD AUTO: 5.58 K/UL (ref 2–7.15)
NEUTROPHILS NFR BLD: 70.4 % (ref 44–72)
NRBC # BLD AUTO: 0 K/UL
NRBC BLD-RTO: 0 /100 WBC
PLATELET # BLD AUTO: 269 K/UL (ref 164–446)
PMV BLD AUTO: 11.4 FL (ref 9–12.9)
POTASSIUM SERPL-SCNC: 4.7 MMOL/L (ref 3.6–5.5)
RBC # BLD AUTO: 4.44 M/UL (ref 4.2–5.4)
SODIUM SERPL-SCNC: 138 MMOL/L (ref 135–145)
WBC # BLD AUTO: 7.9 K/UL (ref 4.8–10.8)

## 2023-03-07 PROCEDURE — 85025 COMPLETE CBC W/AUTO DIFF WBC: CPT

## 2023-03-07 PROCEDURE — 99232 SBSQ HOSP IP/OBS MODERATE 35: CPT | Performed by: INTERNAL MEDICINE

## 2023-03-07 PROCEDURE — 94760 N-INVAS EAR/PLS OXIMETRY 1: CPT

## 2023-03-07 PROCEDURE — 80048 BASIC METABOLIC PNL TOTAL CA: CPT

## 2023-03-07 PROCEDURE — 36415 COLL VENOUS BLD VENIPUNCTURE: CPT

## 2023-03-07 PROCEDURE — 770020 HCHG ROOM/CARE - TELE (206)

## 2023-03-07 PROCEDURE — 700102 HCHG RX REV CODE 250 W/ 637 OVERRIDE(OP): Performed by: FAMILY MEDICINE

## 2023-03-07 PROCEDURE — A9270 NON-COVERED ITEM OR SERVICE: HCPCS | Performed by: FAMILY MEDICINE

## 2023-03-07 PROCEDURE — 92523 SPEECH SOUND LANG COMPREHEN: CPT

## 2023-03-07 RX ADMIN — APIXABAN 10 MG: 5 TABLET, FILM COATED ORAL at 16:47

## 2023-03-07 RX ADMIN — APIXABAN 10 MG: 5 TABLET, FILM COATED ORAL at 04:39

## 2023-03-07 ASSESSMENT — ENCOUNTER SYMPTOMS
COUGH: 0
SHORTNESS OF BREATH: 0
NAUSEA: 0
CHILLS: 0
ABDOMINAL PAIN: 0
FEVER: 0

## 2023-03-07 ASSESSMENT — PAIN DESCRIPTION - PAIN TYPE
TYPE: ACUTE PAIN

## 2023-03-07 ASSESSMENT — FIBROSIS 4 INDEX: FIB4 SCORE: 2.53

## 2023-03-07 NOTE — PROGRESS NOTES
Report received from KATE Joyner. Patient is awake and alert. On 2L nasal cannula. Call light and belongings within reach.

## 2023-03-07 NOTE — PROGRESS NOTES
Telemetry Shift Summary     Rhythm SR, ST  HR Range 64-99, 104  Ectopy none  Measurements  0.16/0.08/0.32           Normal Values  Rhythm SR  HR Range    Measurements 0.12-0.20 / 0.06-0.10  / 0.30-0.52

## 2023-03-07 NOTE — PALLIATIVE CARE
"Palliative Care follow-up  Received phone call from Danna Montanez (niece). She shared concerns regarding Bhavya (caregiver) and PC RN explained that she could contact APS with her concerns. Dicussed that Calos De Souza (brother) is the patient's healthcare surrogate/NOK and was planing on speaking to family about Jess's GOC. She will reach out to him with questions.     Received phone call form Christie Lowery (niece) requesting updates. PC RN shared that Ap Live in the patient's healthcare surrogate/NOK at this time and that he planned to meet with family to discuss GOC for Jess. Christie states, \"I don't know my aunt that well\" and that she believes Jess would want everything done. Encouraged Christie to discuss with Ap.     Plan: Ap to speak with family. Palliative care to continue to follow, provide support, and help facilitate decision-making as clinical picture evolves.    Thank you for allowing Palliative Care to support this patient and family. Contact [phone] for additional assistance, change in patient status, or with any questions/concerns.    "

## 2023-03-07 NOTE — CARE PLAN
The patient is Stable - Low risk of patient condition declining or worsening    Shift Goals  Clinical Goals: maintain SpO2 >90%  Patient Goals: sleep  Family Goals: Receive update    Progress made toward(s) clinical / shift goals:    Problem: Hemodynamics  Goal: Patient's hemodynamics, fluid balance and neurologic status will be stable or improve  Outcome: Progressing     Problem: Fall Risk  Goal: Patient will remain free from falls  Outcome: Progressing     Problem: Skin Integrity  Goal: Skin integrity is maintained or improved  Outcome: Progressing     Problem: Mobility  Goal: Patient's capacity to carry out activities will improve  Outcome: Progressing       Patient is not progressing towards the following goals:

## 2023-03-07 NOTE — CARE PLAN
The patient is Stable - Low risk of patient condition declining or worsening    Shift Goals  Clinical Goals: cognition assessment, safety, skin integrity, SW to discuss plan with familly  Patient Goals: sleep, go home  Family Goals: Receive update    Progress made toward(s) clinical / shift goals:  Pt alert today, oriented x4 but forgetful. Pt caregiver Bhavya at bedside to discuss POC with . Speech completed cognitive eval. Pt able to turn self in bed from side to side.    Patient is not progressing towards the following goals: Social work and MD deciphering who POA is per pt's wishes and solidifying a plan for going forward. PT/OT recommend SNF for rehabilitation needs.       Problem: Discharge Barriers/Planning  Goal: Patient's continuum of care needs are met  Outcome: Progressing     Problem: Self Care  Goal: Patient will have the ability to perform ADLs independently or with assistance (bathe, groom, dress, toilet and feed)  Outcome: Not Progressing

## 2023-03-07 NOTE — PROGRESS NOTES
Telemetry Shift Summary    Rhythm SR/ST  HR Range 80s-100s  Ectopy rare PVC/APC  Measurements 0.14/0.08/0.34        Normal Values  Rhythm SR  HR Range    Measurements 0.12-0.20 / 0.06-0.10  / 0.30-0.52

## 2023-03-07 NOTE — THERAPY
"Speech Language Pathology   Initial Assessment (Cognitive Evaluation)     Patient Name: Jess Diallo  AGE:  78 y.o., SEX:  female  Medical Record #: [id number]  Today's Date: 3/7/2023     Precautions: Fall Risk  Comments: SOB    HPI: Pt is a 79 y/o female who presented 3/3/23 with shortness of breath and lower extremity swelling after experiencing progressively worsening generalized weakness and shortness of breath over the last week. Family reports the pt has become increasingly confused. Per ED MD note: \"CT Chest scan came back with multiple findings for likely PEs and possible lung metastatic lesions.  After discussion with the radiologist I will obtain a CT of the abdomen. Patient will require admission for management of her new PEs, her new hypoxia, and further diagnostic work-up for new malignancy of undetermined etiology.\" Per RN, pt appeared to have difficulty breathing while eating yesterday. Diet was modified to pureed solids and thin liquids and pt was provided with 1:1 assistance.      PMHx: PMHx paranoid schizophrenia and bipolar. No history of SLP found in this EMR.     CXR, 3/3/23:   1. Low lung volume with hypoventilatory change and bibasilar atelectasis.     CTA, Chest, 3/3/23:   1.  Bilateral segmental and distal pulmonary emboli.  2.  Innumerable bilateral pulmonary nodules are most in keeping with pulmonary metastases.  3.  Multiple liver masses are concerning for liver metastases.  4.  Right hilar adenopathy which could be related to metastatic disease.     CT Head, 3/3/23:   1.  There is no acute intracranial hemorrhage or infarct.  2.  White matter lucencies most consistent with small vessel ischemic change versus demyelination or gliosis.  3.  There is cerebral atrophy    Subjective  Patient was seen on this date for a cognitive-linguistic evaluation. Patient awake and participatory. Patient unable to recall current address but stated she lives in [place] with a friend named \"Elena\" who reportedly " "assists with \"everything\" including IADLs such as management of appointments and medications. Pt stated, \"I don't know\" when asked who managed bills. Pt reported she was independent with ADLs at baseline.    Assessment  Portions of the COGNISTAT (Neurobehavioral Cognitive Status Assessment) and other measures were administered.     Patient scored the following on the Cognistat:  Orientation: MILD-MOD (not oriented to age, reason for admission, or time/day)  Attention: AVERAGE  Comprehension (Language): AVERAGE  Repetition (Language): AVERAGE  Naming (Language): AVERAGE  Memory: PROFOUND  -pt unable to recall 4 words with 5 min delay (0/4 recalled with categorical cue, 1/4 recalled given an option of 3  Calculations: MODERATE  Similarities (Reasoning): SEVERE  Judgment (Reasoning): AVERAGE    Further testing revealed moderately impaired clock drawing task based on CLQT scoring (numbers 1-19 arranged around [place], 12-3 placed in correct space, incorrect hand placement). Patient demonstrated difficulty following written instructions (sentence length) for medication management task. Unable to correctly calculate when a dosage should be taken (i.e., every 8 hours) concerning for deficits with executive functioning.     Recommendations  Patient is presenting with significantly impaired short term memory and other cognitive impairments that will likely affect performance on IADLs. Unknown baseline in regards to mentation but per pt report pt was requiring assistance with IADLs at baseline. SLP following while in-house to determine carryover of functional strategies.       Plan  Recommend Speech Therapy 3 times per week until therapy goals are met for the following treatments:  Dysphagia Training, Cognitive-Linguistic Training, and Patient / Family / Caregiver Education.    Discharge Recommendations: Recommend home health for continued speech therapy services       03/07/23 1124   Vitals   O2 (LPM) 3   O2 Delivery Device " Silicone Nasal Cannula   Short Term Goals   Short Term Goal # 2 Patient will utilize compensatory strategies for improved delayed recall given min cues.   Short Term Goal # 3 Patient will be AAOx4 across 3 consecutive session given min cues to posted visual aids.

## 2023-03-07 NOTE — DISCHARGE PLANNING
Care Transition Team Discharge Planning       Ap( patient's brother) [phone] has agreed to be medical POA for patient.  MARTA asked brother that he reach out to other family members with updates rather than each member calling unit rn. He has agreed.  MARTA attempted to contact radha Swan to give update above and left voice mail instructions to receive info from Ap rather than hospital.  No contact # for Danna  MARTA met with Vianca (caregiver) at bedside to inform her that Ap will be decision maker.  Ap has also agreed to be in contact with Vianca(CG) as she is visiting daily.  MARTA gave Vianca medicaid application to complete for Jess (SNAP,NUÑEZ BENEFITS, MEDICAL ASSISTANCE)              Discharge Plan:  TBD, possible SNF pending goals of care established.

## 2023-03-07 NOTE — PROGRESS NOTES
Hospital Medicine Daily Progress Note    Date of Service  3/7/2023    Chief Complaint  Jess Diallo is a 78 y.o. female admitted 3/3/2023 with AMS    Hospital Course  This is a female with a history of bipolar disorder vs schizophrenia, PVD, depression who presented to hospital with SOB and LE edema with concurrent confusion.  On arrival to the ER, she was afebrile, tachycardic and requiring 3L O2. White count was elevated, CTA demonstrated multiple segmental and distal pulmonary emboli and she was found to have pulmonary metastases along with multiple liver masses.  Due to the metastatic lesions seen on CTA, CT a/p was obtained which demonstrated possible adrenal mets, a potential cecal mass, as well as LN mets.     Interval Problem Update  3/4 - Stop antibiotics, no signs of infection on imaging or exam - procal likely elevated from malignancy and pt having diarrhea on antibiotics.  Continue heparin for now, if pt can swallow, transition to Eliquis - SLP pending as nursing has concerns over swallow ability.   Referral placed to Onc as an outpatient, primary likely a cecal mass - could not discuss prognosis or pt today as she was minimally interactive, though pleasant.  IP Palliative consulted.  Pt's closest living relative is her niece, but her niece states someone named Joseline has POA and lives in [place], but is concerned she is taking financial advantage of the patient and is unable to get a hold of her. I have asked her to email me any POA documentation she may have. Checking echo, trop negative, RV:LV ratio is 1.0.     3/5 - Pt SOB with eating, passed her swallow for purees - covert Heparin to Eliquis. Echo with normal RV function. Pt unable to relay to me why she is in the hospital today - did not remember our discussion yesterday regarding PTE and metastatic cancer. Pt will likely need liver biopsy at some point in time if palliative chemo is desired, but pt not able to demonstrate capacity currently. Continue  Eliquis in hospital, check BNP, CM aware that dispo will likely be an issue as pt lives with a caregiver at home and may have increased needs. Consult to PT/OT today. CM aware to do a NOK search - current NOK on file is pt's niece whom she has had very little contact with.     3/6- Pt requiring 3L at baseline, unclear how much contribution from PTE vs pulmonary mets.  Discussed with PT/OT, now recommending SNF - order placed.  Pt is amenable to SNF.  Discussed at length with pt's niece, caregiver (Marleen), and attempted to call pt's reported brother Ap. There is a lot of confusion regarding pt's family situation - the pt currently does not have capacity, NOK has not clearly been established, but at this point, is sounds like Ap is brother by blood. I did attempt to call him today but he was going to his Radiation therapy. There is no paperwork to confirm that this Joseline character has DPOA. At this point, pt and niece both agree that SNF is appropriate, whether she decides to pursue biopsy and palliative chemo can be decided as an outpatient as Onc referral has been placed. SNF referrals to occur today.    PATIENT SEEN BY PREVIOUS HOSPITALIST UNTIL 3/6    3/7: Patient seen at bedside this morning.  No family members present at bedside, however I was able to talk to the patient's caregiver Marleen, as well as the patient's brother Ap.  At this time it appears that Ap is making medical decisions for the patient.  Patient does not seem to understand completely what is going on with her medically, at this point the patient was diagnosed with most likely metastatic disease, unclear primary, however family does not want to pursue biopsy at this time and would like to wait until they see oncology as an outpatient.  Referral is already in place.  We are pending placement, we appreciate further recommendations by case management. As per nursing, no overnight events reported.    I have discussed this patient's  plan of care and discharge plan at IDT rounds today with Case Management, Nursing, Nursing leadership, and other members of the IDT team.    Consultants/Specialty  Palliative    Code Status  Full Code    Disposition  Patient is medically cleared for discharge.   Anticipate discharge to to skilled nursing facility.  I have placed the appropriate orders for post-discharge needs.    Review of Systems  Review of Systems   Constitutional:  Negative for chills and fever.   Respiratory:  Negative for cough and shortness of breath.    Cardiovascular:  Negative for chest pain and leg swelling.   Gastrointestinal:  Negative for abdominal pain and nausea.   All other systems reviewed and are negative.     Physical Exam  Temp:  [36.1 °C (97 °F)-37.2 °C (98.9 °F)] 36.8 °C (98.2 °F)  Pulse:  [] 81  Resp:  [16-28] 18  BP: (108-136)/(57-69) 121/69  SpO2:  [90 %-98 %] 93 %    Physical Exam  Vitals and nursing note reviewed.   Constitutional:       Appearance: Normal appearance. She is not ill-appearing.   HENT:      Head: Normocephalic and atraumatic.      Mouth/Throat:      Mouth: Mucous membranes are moist.   Cardiovascular:      Rate and Rhythm: Normal rate and regular rhythm.   Pulmonary:      Effort: Pulmonary effort is normal. No respiratory distress.      Breath sounds: Normal breath sounds. No wheezing or rales.   Abdominal:      General: Abdomen is flat. Bowel sounds are normal.      Palpations: Abdomen is soft.   Musculoskeletal:         General: No swelling.   Skin:     General: Skin is warm and dry.   Neurological:      General: No focal deficit present.      Mental Status: She is alert.      Comments: Unclear intellectual baseline, pt minimally interactive.    Psychiatric:         Mood and Affect: Mood normal.         Behavior: Behavior normal.       Fluids    Intake/Output Summary (Last 24 hours) at 3/7/2023 1417  Last data filed at 3/7/2023 0900  Gross per 24 hour   Intake 340 ml   Output 1150 ml   Net -810 ml          Laboratory  Recent Labs     03/05/23  0405 03/06/23  0233 03/07/23  0147   WBC 8.5 8.2 7.9   RBC 4.04* 4.21 4.44   HEMOGLOBIN 10.4* 10.9* 11.5*   HEMATOCRIT 33.6* 36.2* 37.8   MCV 83.2 86.0 85.1   MCH 25.7* 25.9* 25.9*   MCHC 31.0* 30.1* 30.4*   RDW 46.8 47.8 47.8   PLATELETCT 300 285 269   MPV 10.2 10.3 11.4       Recent Labs     03/05/23  0405 03/06/23  0233 03/07/23  0147   SODIUM 135 137 138   POTASSIUM 3.6 4.2 4.7   CHLORIDE 105 106 107   CO2 19* 21 23   GLUCOSE 88 84 83   BUN 7* 6* 7*   CREATININE 0.52 0.49* 0.53   CALCIUM 7.6* 7.9* 8.1*                       Imaging  EC-ECHOCARDIOGRAM COMPLETE W/O CONT   Final Result      CT-ABDOMEN-PELVIS WITH   Final Result      1.  There are multiple bilobar hepatic lesions consistent with metastatic disease.   2.  There are bilateral pulmonary nodules in the lung bases consistent with metastatic disease.   3.  Slightly plump adrenal glands without definitive mass. Metastasis is not excluded.   4.  Abnormal soft tissue in the medial cecum suspicious for potential cecal mass.   5.  There is a necrotic lymph node in the portacaval region suspicious for metastasis.   6.  Distal colonic diverticulosis without diverticulitis.   7.  There is a large hiatal hernia.   8.  Incidental simple renal cyst. No further imaging follow-up per ACR guidelines.      CT-CTA CHEST PULMONARY ARTERY W/ RECONS   Final Result         1.  Bilateral segmental and distal pulmonary emboli.   2.  Innumerable bilateral pulmonary nodules are most in keeping with pulmonary metastases.   3.  Multiple liver masses are concerning for liver metastases.   4.  Right hilar adenopathy which could be related to metastatic disease.      These findings were discussed with STACI PATEL on 3/3/2023 6:37 PM.            CT-HEAD W/O   Final Result      1.  There is no acute intracranial hemorrhage or infarct.      2.  White matter lucencies most consistent with small vessel ischemic change versus demyelination or  gliosis.      3.  There is cerebral atrophy.         DX-CHEST-PORTABLE (1 VIEW)   Final Result         1. Low lung volume with hypoventilatory change and bibasilar atelectasis.             Assessment/Plan  * Acute hypoxemic respiratory failure (HCC)- (present on admission)  Assessment & Plan  Likely secondary to pulmonary embolism and metastatic disease.  Therapeutic anticoagulation.  Oxygen as needed, Respiratory protocol, Bronchodilators, Incentive spirometry     Cognitive change  Assessment & Plan  - Unclear baseline - pt's NOK is her niece, but apparently they had little contact throughout pt's life and her true baseline is not known  - Pt lives with a caregiver who is not DPOA  - On a day to day basis, pt not able to remember our conversation from yesterday regarding reason for admission, and could not remember discussing the evidence of metastatic disease. She does not show evidence of understanding the consequences of her choices at this time - this can change on a day to day basis, will order a cog eval to help discern pt's underlying issues and needs  - DPOA apparently some person in [place] named Joseline, we do not have contact information for her - we do not have evidence that this paperwork actually exist  - Now there is question as to whether patient may have a brother, Ap (number [phone]) - he was unable to speak with me on the phone today but his wife confirmed that he is the pt's biological brother    Normocytic anemia  Assessment & Plan  - No current s/s of bleeding    Paranoid schizophrenia (HCC)  Assessment & Plan  - Home meds are currently unknown  - CM spoke with pts APRN who advised pt was on Geodon as an outpatient but that she was sedated on the same   - Nursing attempting to get home MAR   - Currently, pt is without any paranoid delusions, is pleasant and cooperative - await MAR to further dictate medications    SIRS (systemic inflammatory response syndrome) (HCC)- (present on  admission)  Assessment & Plan  SIRS criteria identified on my evaluation include:  Tachycardia, with heart rate greater than 90 BPM, Tachypnea, with respirations greater than 20 per minute and Leukocytosis, with WBC greater than 12,000  No clear evidence for infection at this point.  SIRS criteria could be related to her metastatic cancer  Started on ceftriaxone in the emergency room, stopped as no evidence of infection    Metastatic disease (HCC)- (present on admission)  Assessment & Plan  - Likely cecal mass as primary  - Mets to the lungs, liver, adrenals, LNs on imaging  - Referral placed to Oncology, if pt wishes to pursue palliative treatment, will need a tissue biopsy, liver being the most likely target  - Palliative consulted      Encephalopathy acute- (present on admission)  Assessment & Plan  CT scan of the head did not show evidence for acute infarction or hemorrhage  Likely metabolic likely secondary to hypoxemia  Resolved     Elevated liver enzymes- (present on admission)  Assessment & Plan  Likely related to metastatic disease  RESOLVED   Continue to monitor, avoid/minimize hepatotoxins as much as possible.     Hyponatremia- (present on admission)  Assessment & Plan  Resolved     Acute pulmonary embolism (HCC)- (present on admission)  Assessment & Plan  Imaging shows evidence for bilateral segmental and distal pulmonary emboli.  Likely provoked by malignancy   No RV strain on echo, negative trop  Heparin drip - transitioned to Eliquis  Oxygen as needed, Respiratory protocol, Incentive spirometry          VTE prophylaxis: SCDs/TEDs and therapeutic anticoagulation with Eliquis    I have performed a physical exam and reviewed and updated ROS and Plan today (3/7/2023). In review of yesterday's note (3/6/2023), there are no changes except as documented above.

## 2023-03-08 LAB
BACTERIA BLD CULT: NORMAL
BACTERIA BLD CULT: NORMAL
SARS-COV+SARS-COV-2 AG RESP QL IA.RAPID: NOTDETECTED
SIGNIFICANT IND 70042: NORMAL
SIGNIFICANT IND 70042: NORMAL
SITE SITE: NORMAL
SITE SITE: NORMAL
SOURCE SOURCE: NORMAL
SOURCE SOURCE: NORMAL
SPECIMEN SOURCE: NORMAL

## 2023-03-08 PROCEDURE — 700102 HCHG RX REV CODE 250 W/ 637 OVERRIDE(OP): Performed by: HOSPITALIST

## 2023-03-08 PROCEDURE — 92526 ORAL FUNCTION THERAPY: CPT

## 2023-03-08 PROCEDURE — A9270 NON-COVERED ITEM OR SERVICE: HCPCS | Performed by: FAMILY MEDICINE

## 2023-03-08 PROCEDURE — A9270 NON-COVERED ITEM OR SERVICE: HCPCS | Performed by: HOSPITALIST

## 2023-03-08 PROCEDURE — 94760 N-INVAS EAR/PLS OXIMETRY 1: CPT

## 2023-03-08 PROCEDURE — 97129 THER IVNTJ 1ST 15 MIN: CPT

## 2023-03-08 PROCEDURE — 99232 SBSQ HOSP IP/OBS MODERATE 35: CPT | Performed by: INTERNAL MEDICINE

## 2023-03-08 PROCEDURE — 770020 HCHG ROOM/CARE - TELE (206)

## 2023-03-08 PROCEDURE — 700102 HCHG RX REV CODE 250 W/ 637 OVERRIDE(OP): Performed by: FAMILY MEDICINE

## 2023-03-08 PROCEDURE — 87426 SARSCOV CORONAVIRUS AG IA: CPT

## 2023-03-08 RX ADMIN — APIXABAN 10 MG: 5 TABLET, FILM COATED ORAL at 18:07

## 2023-03-08 RX ADMIN — APIXABAN 10 MG: 5 TABLET, FILM COATED ORAL at 04:23

## 2023-03-08 RX ADMIN — SENNOSIDES AND DOCUSATE SODIUM 2 TABLET: 50; 8.6 TABLET ORAL at 18:07

## 2023-03-08 RX ADMIN — SENNOSIDES AND DOCUSATE SODIUM 2 TABLET: 50; 8.6 TABLET ORAL at 04:26

## 2023-03-08 ASSESSMENT — ENCOUNTER SYMPTOMS
ABDOMINAL PAIN: 0
SHORTNESS OF BREATH: 0
CHILLS: 0
COUGH: 0
FEVER: 0
NAUSEA: 0

## 2023-03-08 ASSESSMENT — PAIN DESCRIPTION - PAIN TYPE: TYPE: ACUTE PAIN

## 2023-03-08 NOTE — PROGRESS NOTES
Hospital Medicine Daily Progress Note    Date of Service  3/8/2023    Chief Complaint  Jess Diallo is a 78 y.o. female admitted 3/3/2023 with AMS    Hospital Course  This is a female with a history of bipolar disorder vs schizophrenia, PVD, depression who presented to hospital with SOB and LE edema with concurrent confusion.  On arrival to the ER, she was afebrile, tachycardic and requiring 3L O2. White count was elevated, CTA demonstrated multiple segmental and distal pulmonary emboli and she was found to have pulmonary metastases along with multiple liver masses.  Due to the metastatic lesions seen on CTA, CT a/p was obtained which demonstrated possible adrenal mets, a potential cecal mass, as well as LN mets.     Interval Problem Update  3/4 - Stop antibiotics, no signs of infection on imaging or exam - procal likely elevated from malignancy and pt having diarrhea on antibiotics.  Continue heparin for now, if pt can swallow, transition to Eliquis - SLP pending as nursing has concerns over swallow ability.   Referral placed to Onc as an outpatient, primary likely a cecal mass - could not discuss prognosis or pt today as she was minimally interactive, though pleasant.  IP Palliative consulted.  Pt's closest living relative is her niece, but her niece states someone named Joseline has POA and lives in [place], but is concerned she is taking financial advantage of the patient and is unable to get a hold of her. I have asked her to email me any POA documentation she may have. Checking echo, trop negative, RV:LV ratio is 1.0.     3/5 - Pt SOB with eating, passed her swallow for purees - covert Heparin to Eliquis. Echo with normal RV function. Pt unable to relay to me why she is in the hospital today - did not remember our discussion yesterday regarding PTE and metastatic cancer. Pt will likely need liver biopsy at some point in time if palliative chemo is desired, but pt not able to demonstrate capacity currently. Continue  Eliquis in hospital, check BNP, CM aware that dispo will likely be an issue as pt lives with a caregiver at home and may have increased needs. Consult to PT/OT today. CM aware to do a NOK search - current NOK on file is pt's niece whom she has had very little contact with.     3/6- Pt requiring 3L at baseline, unclear how much contribution from PTE vs pulmonary mets.  Discussed with PT/OT, now recommending SNF - order placed.  Pt is amenable to SNF.  Discussed at length with pt's niece, caregiver (Marleen), and attempted to call pt's reported brother Ap. There is a lot of confusion regarding pt's family situation - the pt currently does not have capacity, NOK has not clearly been established, but at this point, is sounds like Ap is brother by blood. I did attempt to call him today but he was going to his Radiation therapy. There is no paperwork to confirm that this Joseline character has DPOA. At this point, pt and niece both agree that SNF is appropriate, whether she decides to pursue biopsy and palliative chemo can be decided as an outpatient as Onc referral has been placed. SNF referrals to occur today.    PATIENT SEEN BY PREVIOUS HOSPITALIST UNTIL 3/6    3/7: Patient seen at bedside this morning.  No family members present at bedside, however I was able to talk to the patient's caregiver Marleen, as well as the patient's brother Ap.  At this time it appears that Ap is making medical decisions for the patient.  Patient does not seem to understand completely what is going on with her medically, at this point the patient was diagnosed with most likely metastatic disease, unclear primary, however family does not want to pursue biopsy at this time and would like to wait until they see oncology as an outpatient.  Referral is already in place.  We are pending placement, we appreciate further recommendations by case management. As per nursing, no overnight events reported.    3/8: Patient seen at bedside this  morning.  No overnight events reported.  The patient currently not complaining of any pain.  I talked to the patient's brother, Ap, who is currently the POA.  He would like for the patient to be DNR/DNI.  We are still pending placement, we appreciate further recommendations by case management.  --I received a message from palliative care, it appears Ap has changed his mind regarding code status and we will switch back to full code.    I have discussed this patient's plan of care and discharge plan at IDT rounds today with Case Management, Nursing, Nursing leadership, and other members of the IDT team.    Consultants/Specialty  Palliative    Code Status  DNAR/DNI    Disposition  Patient is medically cleared for discharge.   Anticipate discharge to to skilled nursing facility.  I have placed the appropriate orders for post-discharge needs.    Review of Systems  Review of Systems   Constitutional:  Negative for chills and fever.   Respiratory:  Negative for cough and shortness of breath.    Cardiovascular:  Negative for chest pain and leg swelling.   Gastrointestinal:  Negative for abdominal pain and nausea.   All other systems reviewed and are negative.     Physical Exam  Temp:  [36.6 °C (97.9 °F)-37 °C (98.6 °F)] 36.9 °C (98.5 °F)  Pulse:  [80-97] 88  Resp:  [16-18] 17  BP: (113-128)/(55-77) 128/77  SpO2:  [90 %-93 %] 93 %    Physical Exam  Vitals and nursing note reviewed.   Constitutional:       Appearance: Normal appearance. She is not ill-appearing.   HENT:      Head: Normocephalic and atraumatic.      Mouth/Throat:      Mouth: Mucous membranes are moist.   Cardiovascular:      Rate and Rhythm: Normal rate and regular rhythm.   Pulmonary:      Effort: Pulmonary effort is normal. No respiratory distress.      Breath sounds: Normal breath sounds. No wheezing or rales.   Abdominal:      General: Abdomen is flat. Bowel sounds are normal.      Palpations: Abdomen is soft.   Musculoskeletal:         General: No  swelling.   Skin:     General: Skin is warm and dry.   Neurological:      General: No focal deficit present.      Mental Status: She is alert.      Comments: Unclear intellectual baseline, pt minimally interactive.    Psychiatric:         Mood and Affect: Mood normal.         Behavior: Behavior normal.       Fluids  No intake or output data in the 24 hours ending 03/08/23 1235      Laboratory  Recent Labs     03/06/23 0233 03/07/23  0147   WBC 8.2 7.9   RBC 4.21 4.44   HEMOGLOBIN 10.9* 11.5*   HEMATOCRIT 36.2* 37.8   MCV 86.0 85.1   MCH 25.9* 25.9*   MCHC 30.1* 30.4*   RDW 47.8 47.8   PLATELETCT 285 269   MPV 10.3 11.4       Recent Labs     03/06/23  0233 03/07/23  0147   SODIUM 137 138   POTASSIUM 4.2 4.7   CHLORIDE 106 107   CO2 21 23   GLUCOSE 84 83   BUN 6* 7*   CREATININE 0.49* 0.53   CALCIUM 7.9* 8.1*                       Imaging  EC-ECHOCARDIOGRAM COMPLETE W/O CONT   Final Result      CT-ABDOMEN-PELVIS WITH   Final Result      1.  There are multiple bilobar hepatic lesions consistent with metastatic disease.   2.  There are bilateral pulmonary nodules in the lung bases consistent with metastatic disease.   3.  Slightly plump adrenal glands without definitive mass. Metastasis is not excluded.   4.  Abnormal soft tissue in the medial cecum suspicious for potential cecal mass.   5.  There is a necrotic lymph node in the portacaval region suspicious for metastasis.   6.  Distal colonic diverticulosis without diverticulitis.   7.  There is a large hiatal hernia.   8.  Incidental simple renal cyst. No further imaging follow-up per ACR guidelines.      CT-CTA CHEST PULMONARY ARTERY W/ RECONS   Final Result         1.  Bilateral segmental and distal pulmonary emboli.   2.  Innumerable bilateral pulmonary nodules are most in keeping with pulmonary metastases.   3.  Multiple liver masses are concerning for liver metastases.   4.  Right hilar adenopathy which could be related to metastatic disease.      These findings  were discussed with STACI PATEL on 3/3/2023 6:37 PM.            CT-HEAD W/O   Final Result      1.  There is no acute intracranial hemorrhage or infarct.      2.  White matter lucencies most consistent with small vessel ischemic change versus demyelination or gliosis.      3.  There is cerebral atrophy.         DX-CHEST-PORTABLE (1 VIEW)   Final Result         1. Low lung volume with hypoventilatory change and bibasilar atelectasis.             Assessment/Plan  * Acute hypoxemic respiratory failure (HCC)- (present on admission)  Assessment & Plan  Likely secondary to pulmonary embolism and metastatic disease.  Therapeutic anticoagulation.  Oxygen as needed, Respiratory protocol, Bronchodilators, Incentive spirometry     Cognitive change  Assessment & Plan  - Unclear baseline - pt's NOK is her niece, but apparently they had little contact throughout pt's life and her true baseline is not known  - Pt lives with a caregiver who is not DPOA  - On a day to day basis, pt not able to remember our conversation from yesterday regarding reason for admission, and could not remember discussing the evidence of metastatic disease. She does not show evidence of understanding the consequences of her choices at this time - this can change on a day to day basis, will order a cog eval to help discern pt's underlying issues and needs  - DPOA apparently some person in [place] named Joseline, we do not have contact information for her - we do not have evidence that this paperwork actually exist  - Now there is question as to whether patient may have a brother, Ap (number [phone]) - he was unable to speak with me on the phone today but his wife confirmed that he is the pt's biological brother    Normocytic anemia  Assessment & Plan  - No current s/s of bleeding    Paranoid schizophrenia (HCC)  Assessment & Plan  - Home meds are currently unknown  - CM spoke with pts APRN who advised pt was on Geodon as an outpatient but that she was  sedated on the same   - Nursing attempting to get home MAR   - Currently, pt is without any paranoid delusions, is pleasant and cooperative - await MAR to further dictate medications    SIRS (systemic inflammatory response syndrome) (HCC)- (present on admission)  Assessment & Plan  SIRS criteria identified on my evaluation include:  Tachycardia, with heart rate greater than 90 BPM, Tachypnea, with respirations greater than 20 per minute and Leukocytosis, with WBC greater than 12,000  No clear evidence for infection at this point.  SIRS criteria could be related to her metastatic cancer  Started on ceftriaxone in the emergency room, stopped as no evidence of infection    Metastatic disease (HCC)- (present on admission)  Assessment & Plan  - Likely cecal mass as primary  - Mets to the lungs, liver, adrenals, LNs on imaging  - Referral placed to Oncology, if pt wishes to pursue palliative treatment, will need a tissue biopsy, liver being the most likely target  - Palliative consulted      Encephalopathy acute- (present on admission)  Assessment & Plan  CT scan of the head did not show evidence for acute infarction or hemorrhage  Likely metabolic likely secondary to hypoxemia  Resolved     Elevated liver enzymes- (present on admission)  Assessment & Plan  Likely related to metastatic disease  RESOLVED   Continue to monitor, avoid/minimize hepatotoxins as much as possible.     Hyponatremia- (present on admission)  Assessment & Plan  Resolved     Acute pulmonary embolism (HCC)- (present on admission)  Assessment & Plan  Imaging shows evidence for bilateral segmental and distal pulmonary emboli.  Likely provoked by malignancy   No RV strain on echo, negative trop  Heparin drip - transitioned to Eliquis  Oxygen as needed, Respiratory protocol, Incentive spirometry          VTE prophylaxis: SCDs/TEDs and therapeutic anticoagulation with Eliquis    I have performed a physical exam and reviewed and updated ROS and Plan today  (3/8/2023). In review of yesterday's note (3/7/2023), there are no changes except as documented above.

## 2023-03-08 NOTE — DISCHARGE PLANNING
Care Transition Team Discharge Planning       SW attended am roshni.  Attending reports that patient is medically stable for d/c today.   [facility] and [facility] have accepted.  SW spoke with DIPTI (Ap) and he has decided on [facility].  Barriers to d/c:  [facility] has no beds today but can accept tomorrow.  Ap to pass on info to other relatives.  MARTA left message on Vianca (caregiver) phone with d/c for tomorrow.  PASSR completed.  Patient can go by w/c van  IMM issued.  Update given to MD who is requesting that d/c be after 10am tomorrow.  Update given to MD and will continue to assist with d/c needs.            Discharge Plan:  SNF-[facility].

## 2023-03-08 NOTE — PROGRESS NOTES
12 hour chart pain check complete     Telemetry Shift Summary    Rhythm Sinus Rhythm  HR Range 79-92  Ectopy frequent PVC  Measurements .16/.08/.38        Normal Values  Rhythm SR  HR Range    Measurements 0.12-0.20 / 0.06-0.10  / 0.30-0.52

## 2023-03-08 NOTE — PALLIATIVE CARE
Palliative Care follow-up  Received phone call from Christie sinclair) with questions regarding discharge and placement. Provided phone number for Charlene HARVEY. Christie states she is driving from [place] and plans to be here late tomorrow or late Friday depending on weather/road conditions.     1031: Volte message to Charlene with updates that Christie will be calling.     Updated: CM    Plan: Palliative care to continue to follow, provide support, and help facilitate decision-making as clinical picture evolves. POLST prior to discharge.     Thank you for allowing Palliative Care to support this patient and family. Contact [phone] for additional assistance, change in patient status, or with any questions/concerns.

## 2023-03-08 NOTE — PROGRESS NOTES
Charge Nurse Rounding Note    Bedside rounding completed to address quality of care and overall patient experience.    Patient Satisfaction addressed including staff responsiveness. Patient/family are aware of the POC and any questions answered. Thorough safety education completed including use of call light prior to all mobility throughout the entirety of the hospital stay.     Patient/family aware of time of next Hourly Round.    No further questions/concerns currently.     Additional Notes: No additional needs at this time

## 2023-03-08 NOTE — PALLIATIVE CARE
"Palliative Care follow-up  Received phone call from Ap and Krista Chung. Ap states, \"I want to take the DNR off\" and shared that, \"I guess she told other family members she want to be resuscitated but no tubes.\" We discussed code status in detail and the measures employed in a full code including CPR (chest compressions & intubation), medications, and possibly defibrillation. Ap states, \"Oh dear.\" I shared it is difficult to know in advance of Full Code measures what the future outcome will be.  Expressed concern that due to patient's age and multiple co-morbidities, she may not endure invasive code measures well, and even if she was successfully resuscitated, she may come out of it with significantly reduced quality of life.  Ap verbalized understanding and states that \"I want to respect her wishes and make her a full code.\"     PC RN encourage Ap and family to have ongoing conversations with Jess and with one another while her clinical picture unfolds out patient. Discussed that it is important to include Jess as much as possible and consider quality of life, benefit verus burden of treatment and relief of suffering. Ap agreed and states it is most important that she doesn't suffer and maintains a quality of life that would be acceptable to her. Reassured Ap and Krista that myself and the PC team are here to provide support to both of them and pt during this difficult time.  They were grateful.     1322: PC RN updated Dr. Magan Toledo regarding Ap's wishes.     Updated: MD    Plan: [facility].     Thank you for allowing Palliative Care to support this patient and family. Contact [phone] for additional assistance, change in patient status, or with any questions/concerns.    "

## 2023-03-08 NOTE — PROGRESS NOTES
Telemetry Shift Summary     Rhythm SR  HR Range 82-95  Ectopy o-r-rPVC  Measurements  0.14/0.08/0.36           Normal Values  Rhythm SR  HR Range    Measurements 0.12-0.20 / 0.06-0.10  / 0.30-0.52

## 2023-03-08 NOTE — CARE PLAN
The patient is Stable - Low risk of patient condition declining or worsening    Shift Goals  Clinical Goals: safety, O2 saturation  Patient Goals: rest  Family Goals: remain updated and informed    Progress made toward(s) clinical / shift goals:        Problem: Knowledge Deficit - Standard  Goal: Patient and family/care givers will demonstrate understanding of plan of care, disease process/condition, diagnostic tests and medications  Outcome: Not Progressing  Note: Patient requires frequent reinforcement and redirection for task completion and ADL's. Patient also unable to verbalize complete understanding of hospitalization and disease process.      Problem: Respiratory  Goal: Patient will achieve/maintain optimum respiratory ventilation and gas exchange  Outcome: Progressing  Flowsheets (Taken 3/8/2023 2285)  O2 Delivery Device: Nasal Cannula  Note: Patient is successfully maintaining O2 saturation with 2-3L via nasal cannula      Problem: Fall Risk  Goal: Patient will remain free from falls  Outcome: Progressing  Note: Patient has remained free from falls and proper safety precautions are in place       Patient is not progressing towards the following goals:      Problem: Knowledge Deficit - Standard  Goal: Patient and family/care givers will demonstrate understanding of plan of care, disease process/condition, diagnostic tests and medications  Outcome: Not Progressing  Note: Patient requires frequent reinforcement and redirection for task completion and ADL's. Patient also unable to verbalize complete understanding of hospitalization and disease process.

## 2023-03-08 NOTE — PALLIATIVE CARE
"Palliative Care follow-up  PC RN placed phone call to patient's brother, Ap. Krista and Ap present via phone. The has questions regarding SNF, out patient appointments and discharge. PC RN discussed that Jess would need someone to transport her to and from outpatient appointments while in SNF. Discussed that her caregiver could transport if she was agreeable. They explained, \"part of our concern is with the caregiver\" and PC RN discussed options.     Ap asked how the patient was doing today and PC RN provided updates based on documentation from EMR. Ap shared that he is concerned about her quality of life moving forward. He hopes that the SNF and therapies will allow Jess to regain some strength, but is worried she may not. He is also eager to understand recommendations from Oncology to see what Jess's options are.     Revisited code status. Ap shares that, \"I wouldn't want her to suffer\" and \"I don't know what her quality of life would be like.\" I shared it is difficult to know in advance of Full Code measures what the future outcome will be. Ap shared, \"it would be better to let her go\" and \"She ricky go up with our mom, dad, and brother.\" PC RN explained that a DNR would not preclude any treatments/interventions offered to pt. He verbalized understanding and would like the patient to be DNR.  PC RN reviewed POLST form with Ap. He verbalized understanding. He did not wish to complete document via e-mail or fax. He gave permission that Christie Rubin (niece) could sign as healthcare surrogate as she plans to be at the hospital in the coming days.     He further discussed that him and Jess share beliefs regarding \"The Rapture\" and explained the meaning. PC RN offered to involve the spiritual care team and he agreed. PC RN will place consult.     1730: PC RN sent volte to Dr. Maximiliano Toledo and provided updates on conversation.     Updated: MD    Plan: Patient to discharge to SNF pending " acceptance and follow up with Oncology outpatient. POLST prior to discharge.     Thank you for allowing Palliative Care to support this patient and family. Contact [phone] for additional assistance, change in patient status, or with any questions/concerns.

## 2023-03-08 NOTE — PROGRESS NOTES
Report received from KATE Marlow. Call light and belongings placed within reach. Patient is awake.

## 2023-03-08 NOTE — CARE PLAN
The patient is Stable - Low risk of patient condition declining or worsening    Shift Goals  Clinical Goals: safety, maintain O2 sat >90%  Patient Goals: sleep  Family Goals: Receive update    Progress made toward(s) clinical / shift goals:    Problem: Hemodynamics  Goal: Patient's hemodynamics, fluid balance and neurologic status will be stable or improve  Outcome: Progressing     Problem: Fall Risk  Goal: Patient will remain free from falls  Outcome: Progressing     Problem: Skin Integrity  Goal: Skin integrity is maintained or improved  Outcome: Progressing       Patient is not progressing towards the following goals:

## 2023-03-08 NOTE — THERAPY
Speech Language Pathology  Daily Treatment     Patient Name: Jess Diallo  Age:  78 y.o., Sex:  female  Medical Record #: [id number]  Today's Date: 3/8/2023     Precautions  Precautions: Fall Risk, Swallow Precautions  Comments: SOB    Assessment    Patient was seen for dysphagia and cognitive-linguistic tx this date in the context of a breakfast meal. Patient was alert, flat and cooperative. She demonstrated reduced initiation and needing ongoing verbal cues to consume her breakfast/feed herself.     Swallowing:  Pt consumed pureed solids and thin liquids via cup/carton with adequate oral clearance and no overt s/sx of aspiration. Therapeutic trials of soft solids administered with pt demonstrating adequate mashing with lower dentition and upper gums. However, she did demonstrate increased WOB/SOB while chewing this texture after ~3 oz of intake with pt requiring rest break. Though no overt s/sx of aspiration occurred w/ PO intake, pt did demonstrate frequent belching after swallowing with all textures. She denied sensation of food/liquid regurgitating or coming back up. Oral care was completed following the meal to reduce risk of aspiration PNA.    Cognitive-Linguistic:  Patient was A&Ox3-4, unsure of exact date/MALORIE but able to use the bedside board to reorient with min cues across 2 trials. She recalled why she was admitted to the hospital but did not know why she is experiencing SOB (unclear if pt has been informed of imaging results). She recalled 3 units of functional information following a 5 minute delay given min cues for repetition strategy.     Recommendations:  Minced/moist solids and thin liquids   Pills whole w/ thins or float in puree  Oral care after meals  Direct supervision during meals  Reorient using bedside board    Plan    Continue current treatment plan.    Discharge Recommendations: Recommend home health for continued speech therapy services       Objective       03/08/23 6063   Patient / Family  "Goals   Patient / Family Goal #1 \"I'll have some more\" re: pudding   Goal #1 Outcome Progressing as expected   Short Term Goals   Short Term Goal # 1 Pt will consume PU4/TN0 free from s/sx aspiration or respiratory compromise given moderate cues for safe swallow strategies.   Goal Outcome # 1 Goal met, new goal added   Short Term Goal # 1 B  Patient will consume meals of minced/moist solids and thin liquids with no s/sx of aspiration given direct supervision.   Short Term Goal # 2 Patient will utilize compensatory strategies for improved delayed recall given min cues.   Goal Outcome # 2  Progressing as expected   Short Term Goal # 3 Patient will be AAOx4 across 3 consecutive session given min cues to posted visual aids.   Goal Outcome  # 3 Progressing as expected       "

## 2023-03-09 VITALS
WEIGHT: 197.09 LBS | HEIGHT: 69 IN | HEART RATE: 86 BPM | SYSTOLIC BLOOD PRESSURE: 125 MMHG | BODY MASS INDEX: 29.19 KG/M2 | DIASTOLIC BLOOD PRESSURE: 56 MMHG | OXYGEN SATURATION: 91 % | TEMPERATURE: 98.7 F | RESPIRATION RATE: 18 BRPM

## 2023-03-09 PROCEDURE — 700102 HCHG RX REV CODE 250 W/ 637 OVERRIDE(OP): Performed by: FAMILY MEDICINE

## 2023-03-09 PROCEDURE — A9270 NON-COVERED ITEM OR SERVICE: HCPCS | Performed by: FAMILY MEDICINE

## 2023-03-09 PROCEDURE — 99239 HOSP IP/OBS DSCHRG MGMT >30: CPT | Performed by: INTERNAL MEDICINE

## 2023-03-09 RX ADMIN — APIXABAN 10 MG: 5 TABLET, FILM COATED ORAL at 05:07

## 2023-03-09 ASSESSMENT — ENCOUNTER SYMPTOMS
CHILLS: 0
FEVER: 0
ABDOMINAL PAIN: 0
SHORTNESS OF BREATH: 0
NAUSEA: 0
COUGH: 0

## 2023-03-09 ASSESSMENT — PAIN DESCRIPTION - PAIN TYPE: TYPE: ACUTE PAIN

## 2023-03-09 ASSESSMENT — FIBROSIS 4 INDEX: FIB4 SCORE: 2.53

## 2023-03-09 NOTE — PROGRESS NOTES
Hospital Medicine Daily Progress Note    Date of Service  3/9/2023    Chief Complaint  Jess Diallo is a 78 y.o. female admitted 3/3/2023 with AMS    Hospital Course  This is a female with a history of bipolar disorder vs schizophrenia, PVD, depression who presented to hospital with SOB and LE edema with concurrent confusion.  On arrival to the ER, she was afebrile, tachycardic and requiring 3L O2. White count was elevated, CTA demonstrated multiple segmental and distal pulmonary emboli and she was found to have pulmonary metastases along with multiple liver masses.  Due to the metastatic lesions seen on CTA, CT a/p was obtained which demonstrated possible adrenal mets, a potential cecal mass, as well as LN mets.     Interval Problem Update  3/4 - Stop antibiotics, no signs of infection on imaging or exam - procal likely elevated from malignancy and pt having diarrhea on antibiotics.  Continue heparin for now, if pt can swallow, transition to Eliquis - SLP pending as nursing has concerns over swallow ability.   Referral placed to Onc as an outpatient, primary likely a cecal mass - could not discuss prognosis or pt today as she was minimally interactive, though pleasant.  IP Palliative consulted.  Pt's closest living relative is her niece, but her niece states someone named Joseline has POA and lives in [place], but is concerned she is taking financial advantage of the patient and is unable to get a hold of her. I have asked her to email me any POA documentation she may have. Checking echo, trop negative, RV:LV ratio is 1.0.     3/5 - Pt SOB with eating, passed her swallow for purees - covert Heparin to Eliquis. Echo with normal RV function. Pt unable to relay to me why she is in the hospital today - did not remember our discussion yesterday regarding PTE and metastatic cancer. Pt will likely need liver biopsy at some point in time if palliative chemo is desired, but pt not able to demonstrate capacity currently. Continue  Eliquis in hospital, check BNP, CM aware that dispo will likely be an issue as pt lives with a caregiver at home and may have increased needs. Consult to PT/OT today. CM aware to do a NOK search - current NOK on file is pt's niece whom she has had very little contact with.     3/6- Pt requiring 3L at baseline, unclear how much contribution from PTE vs pulmonary mets.  Discussed with PT/OT, now recommending SNF - order placed.  Pt is amenable to SNF.  Discussed at length with pt's niece, caregiver (Marleen), and attempted to call pt's reported brother Ap. There is a lot of confusion regarding pt's family situation - the pt currently does not have capacity, NOK has not clearly been established, but at this point, is sounds like Ap is brother by blood. I did attempt to call him today but he was going to his Radiation therapy. There is no paperwork to confirm that this Joseline character has DPOA. At this point, pt and niece both agree that SNF is appropriate, whether she decides to pursue biopsy and palliative chemo can be decided as an outpatient as Onc referral has been placed. SNF referrals to occur today.    PATIENT SEEN BY PREVIOUS HOSPITALIST UNTIL 3/6    3/7: Patient seen at bedside this morning.  No family members present at bedside, however I was able to talk to the patient's caregiver Marleen, as well as the patient's brother Ap.  At this time it appears that Ap is making medical decisions for the patient.  Patient does not seem to understand completely what is going on with her medically, at this point the patient was diagnosed with most likely metastatic disease, unclear primary, however family does not want to pursue biopsy at this time and would like to wait until they see oncology as an outpatient.  Referral is already in place.  We are pending placement, we appreciate further recommendations by case management. As per nursing, no overnight events reported.    3/8: Patient seen at bedside this  morning.  No overnight events reported.  The patient currently not complaining of any pain.  I talked to the patient's brother, Ap, who is currently the POA.  He would like for the patient to be DNR/DNI.  We are still pending placement, we appreciate further recommendations by case management.  --I received a message from palliative care, it appears Ap has changed his mind regarding code status and we will switch back to full code.    3/9: Patient seen at bedside this morning. No overnight events reported, it seems patient's mental acuity waxes and wanes, however patient is very pleasant. We are pending placement. As per case management patient might have a bed available in SNF today.    I have discussed this patient's plan of care and discharge plan at IDT rounds today with Case Management, Nursing, Nursing leadership, and other members of the IDT team.    Consultants/Specialty  Palliative    Code Status  Full Code    Disposition  Patient is medically cleared for discharge.   Anticipate discharge to to skilled nursing facility.  I have placed the appropriate orders for post-discharge needs.    Review of Systems  Review of Systems   Constitutional:  Negative for chills and fever.   Respiratory:  Negative for cough and shortness of breath.    Cardiovascular:  Negative for chest pain and leg swelling.   Gastrointestinal:  Negative for abdominal pain and nausea.   All other systems reviewed and are negative.     Physical Exam  Temp:  [36.7 °C (98 °F)-37.2 °C (98.9 °F)] 36.7 °C (98 °F)  Pulse:  [73-88] 73  Resp:  [16-18] 18  BP: (112-128)/(63-77) 120/63  SpO2:  [89 %-94 %] 94 %    Physical Exam  Vitals and nursing note reviewed.   Constitutional:       Appearance: Normal appearance. She is not ill-appearing.   HENT:      Head: Normocephalic and atraumatic.      Mouth/Throat:      Mouth: Mucous membranes are moist.   Cardiovascular:      Rate and Rhythm: Normal rate and regular rhythm.   Pulmonary:      Effort:  Pulmonary effort is normal. No respiratory distress.      Breath sounds: Normal breath sounds. No wheezing or rales.   Abdominal:      General: Abdomen is flat. Bowel sounds are normal.      Palpations: Abdomen is soft.   Musculoskeletal:         General: No swelling.   Skin:     General: Skin is warm and dry.   Neurological:      General: No focal deficit present.      Mental Status: She is alert.      Comments: Unclear intellectual baseline, pt minimally interactive.    Psychiatric:         Mood and Affect: Mood normal.         Behavior: Behavior normal.       Fluids    Intake/Output Summary (Last 24 hours) at 3/9/2023 0839  Last data filed at 3/8/2023 2000  Gross per 24 hour   Intake 200 ml   Output 400 ml   Net -200 ml         Laboratory  Recent Labs     03/07/23  0147   WBC 7.9   RBC 4.44   HEMOGLOBIN 11.5*   HEMATOCRIT 37.8   MCV 85.1   MCH 25.9*   MCHC 30.4*   RDW 47.8   PLATELETCT 269   MPV 11.4       Recent Labs     03/07/23  0147   SODIUM 138   POTASSIUM 4.7   CHLORIDE 107   CO2 23   GLUCOSE 83   BUN 7*   CREATININE 0.53   CALCIUM 8.1*                       Imaging  EC-ECHOCARDIOGRAM COMPLETE W/O CONT   Final Result      CT-ABDOMEN-PELVIS WITH   Final Result      1.  There are multiple bilobar hepatic lesions consistent with metastatic disease.   2.  There are bilateral pulmonary nodules in the lung bases consistent with metastatic disease.   3.  Slightly plump adrenal glands without definitive mass. Metastasis is not excluded.   4.  Abnormal soft tissue in the medial cecum suspicious for potential cecal mass.   5.  There is a necrotic lymph node in the portacaval region suspicious for metastasis.   6.  Distal colonic diverticulosis without diverticulitis.   7.  There is a large hiatal hernia.   8.  Incidental simple renal cyst. No further imaging follow-up per ACR guidelines.      CT-CTA CHEST PULMONARY ARTERY W/ RECONS   Final Result         1.  Bilateral segmental and distal pulmonary emboli.   2.   Innumerable bilateral pulmonary nodules are most in keeping with pulmonary metastases.   3.  Multiple liver masses are concerning for liver metastases.   4.  Right hilar adenopathy which could be related to metastatic disease.      These findings were discussed with STACI PATEL on 3/3/2023 6:37 PM.            CT-HEAD W/O   Final Result      1.  There is no acute intracranial hemorrhage or infarct.      2.  White matter lucencies most consistent with small vessel ischemic change versus demyelination or gliosis.      3.  There is cerebral atrophy.         DX-CHEST-PORTABLE (1 VIEW)   Final Result         1. Low lung volume with hypoventilatory change and bibasilar atelectasis.             Assessment/Plan  * Acute hypoxemic respiratory failure (HCC)- (present on admission)  Assessment & Plan  Likely secondary to pulmonary embolism and metastatic disease.  Therapeutic anticoagulation.  Oxygen as needed, Respiratory protocol, Bronchodilators, Incentive spirometry     Cognitive change  Assessment & Plan  - Unclear baseline - pt's NOK is her niece, but apparently they had little contact throughout pt's life and her true baseline is not known  - Pt lives with a caregiver who is not DPOA  - On a day to day basis, pt not able to remember our conversation from yesterday regarding reason for admission, and could not remember discussing the evidence of metastatic disease. She does not show evidence of understanding the consequences of her choices at this time - this can change on a day to day basis, will order a cog eval to help discern pt's underlying issues and needs  - DPOA apparently some person in [place] named Joseline, we do not have contact information for her - we do not have evidence that this paperwork actually exist  - Now there is question as to whether patient may have a brother, Ap (number [phone]) - he was unable to speak with me on the phone today but his wife confirmed that he is the pt's biological  brother    Normocytic anemia  Assessment & Plan  - No current s/s of bleeding    Paranoid schizophrenia (HCC)  Assessment & Plan  - Home meds are currently unknown  - CM spoke with pts APRN who advised pt was on Geodon as an outpatient but that she was sedated on the same   - Nursing attempting to get home MAR   - Currently, pt is without any paranoid delusions, is pleasant and cooperative - await MAR to further dictate medications    SIRS (systemic inflammatory response syndrome) (HCC)- (present on admission)  Assessment & Plan  SIRS criteria identified on my evaluation include:  Tachycardia, with heart rate greater than 90 BPM, Tachypnea, with respirations greater than 20 per minute and Leukocytosis, with WBC greater than 12,000  No clear evidence for infection at this point.  SIRS criteria could be related to her metastatic cancer  Started on ceftriaxone in the emergency room, stopped as no evidence of infection    Metastatic disease (HCC)- (present on admission)  Assessment & Plan  - Likely cecal mass as primary  - Mets to the lungs, liver, adrenals, LNs on imaging  - Referral placed to Oncology, if pt wishes to pursue palliative treatment, will need a tissue biopsy, liver being the most likely target  - Palliative consulted      Encephalopathy acute- (present on admission)  Assessment & Plan  CT scan of the head did not show evidence for acute infarction or hemorrhage  Likely metabolic likely secondary to hypoxemia  Resolved     Elevated liver enzymes- (present on admission)  Assessment & Plan  Likely related to metastatic disease  RESOLVED   Continue to monitor, avoid/minimize hepatotoxins as much as possible.     Hyponatremia- (present on admission)  Assessment & Plan  Resolved     Acute pulmonary embolism (HCC)- (present on admission)  Assessment & Plan  Imaging shows evidence for bilateral segmental and distal pulmonary emboli.  Likely provoked by malignancy   No RV strain on echo, negative trop  Heparin  drip - transitioned to Eliquis  Oxygen as needed, Respiratory protocol, Incentive spirometry          VTE prophylaxis: SCDs/TEDs and therapeutic anticoagulation with Eliquis    I have performed a physical exam and reviewed and updated ROS and Plan today (3/9/2023). In review of yesterday's note (3/8/2023), there are no changes except as documented above.

## 2023-03-09 NOTE — DISCHARGE SUMMARY
"Discharge Summary    CHIEF COMPLAINT ON ADMISSION  Chief Complaint   Patient presents with    Peripheral Edema     BLE x 1 wk    Shortness of Breath     Caregiver noted GREEN and SOB at rest yesterday   Orthopnea Pt denies CP       Reason for Admission  Leg Swelling, Shortness of Breath     Admission Date  3/3/2023    CODE STATUS  Full Code    HPI & HOSPITAL COURSE  As per chart review:  \"This is a female with a history of bipolar disorder vs schizophrenia, PVD, depression who presented to hospital with SOB and LE edema with concurrent confusion.  On arrival to the ER, she was afebrile, tachycardic and requiring 3L O2. White count was elevated, CTA demonstrated multiple segmental and distal pulmonary emboli and she was found to have pulmonary metastases along with multiple liver masses.  Due to the metastatic lesions seen on CTA, CT a/p was obtained which demonstrated possible adrenal mets, a potential cecal mass, as well as LN mets.\"    The patient was admitted for further management and care, she was initially started on heparin drip and then transitioned to eliquis. The patient has a hx of schizophrenia, it appears currently not on any medication.     Patient seems to have cognitive decline and mental acuity waxes and wanes, it is clear patient does not understand what is going on with her medically. During hospitalization there was trouble obtaining family member who can make medical decisions for her, but it has been established that the patient's brother \"Ap\" is who is making decisions for her now.     There was long discussions with with family members, caregiver and brother regarding the newly diagnosed possible metastatic disease. At this time it was established that they did not want to pursue biopsies, patient also with anticoagulation. They would like to meet with an oncologist first. Referral to oncology has been placed.    The patient will be transferred to a SNF.    Patient will require close follow " up with PCP and oncology as outpatient.    Therefore, she is discharged in fair and stable condition to skilled nursing facility.    The patient met 2-midnight criteria for an inpatient stay at the time of discharge.    Discharge Date  03/09/2023    FOLLOW UP ITEMS POST DISCHARGE  Patient will require close follow up with PCP and oncology. She will be transferred to a SNF.    DISCHARGE DIAGNOSES  Principal Problem:    Acute hypoxemic respiratory failure (HCC) POA: Yes  Active Problems:    Acute pulmonary embolism (HCC) POA: Yes    Hyponatremia POA: Yes    Elevated liver enzymes POA: Yes    Encephalopathy acute POA: Yes    Metastatic disease (HCC) POA: Yes    SIRS (systemic inflammatory response syndrome) (HCC) POA: Yes    Paranoid schizophrenia (HCC) POA: Unknown    Normocytic anemia POA: Unknown    Cognitive change POA: Unknown  Resolved Problems:    * No resolved hospital problems. *      FOLLOW UP  Future Appointments   Date Time Provider Department Center   3/17/2023  8:40 AM HOLA RetanaRYINK[facility]     HOLA RetanaRMariaelenaNMariaelena  [address]  [place] [address]  [phone]    Schedule an appointment as soon as possible for a visit      Oncology    Schedule an appointment as soon as possible for a visit        MEDICATIONS ON DISCHARGE     Medication List        START taking these medications        Instructions   * apixaban 5mg Tabs  Commonly known as: ELIQUIS   Take 2 Tablets by mouth 2 times a day for 3 days. Indications: DVT/PE  Dose: 10 mg     * apixaban 5mg Tabs  Start taking on: March 12, 2023  Commonly known as: ELIQUIS   Take 1 Tablet by mouth 2 times a day. Indications: DVT/PE  Dose: 5 mg           * This list has 2 medication(s) that are the same as other medications prescribed for you. Read the directions carefully, and ask your doctor or other care provider to review them with you.                  Allergies  Allergies   Allergen Reactions    Pcn [Penicillins]  Unspecified     Unknown childhood reaction        DIET  Orders Placed This Encounter   Procedures    Diet Order Diet: Level 4 - Pureed; Liquid level: Level 0 - Thin     Standing Status:   Standing     Number of Occurrences:   1     Order Specific Question:   Diet:     Answer:   Level 4 - Pureed [25]     Order Specific Question:   Liquid level     Answer:   Level 0 - Thin       ACTIVITY  As tolerated and directed by skilled nursing.  Weight bearing as tolerated and directed by skilled nursing.    CONSULTATIONS  palliative    PROCEDURES      EC-ECHOCARDIOGRAM COMPLETE W/O CONT   Final Result      CT-ABDOMEN-PELVIS WITH   Final Result      1.  There are multiple bilobar hepatic lesions consistent with metastatic disease.   2.  There are bilateral pulmonary nodules in the lung bases consistent with metastatic disease.   3.  Slightly plump adrenal glands without definitive mass. Metastasis is not excluded.   4.  Abnormal soft tissue in the medial cecum suspicious for potential cecal mass.   5.  There is a necrotic lymph node in the portacaval region suspicious for metastasis.   6.  Distal colonic diverticulosis without diverticulitis.   7.  There is a large hiatal hernia.   8.  Incidental simple renal cyst. No further imaging follow-up per ACR guidelines.      CT-CTA CHEST PULMONARY ARTERY W/ RECONS   Final Result         1.  Bilateral segmental and distal pulmonary emboli.   2.  Innumerable bilateral pulmonary nodules are most in keeping with pulmonary metastases.   3.  Multiple liver masses are concerning for liver metastases.   4.  Right hilar adenopathy which could be related to metastatic disease.      These findings were discussed with STACI PATEL on 3/3/2023 6:37 PM.            CT-HEAD W/O   Final Result      1.  There is no acute intracranial hemorrhage or infarct.      2.  White matter lucencies most consistent with small vessel ischemic change versus demyelination or gliosis.      3.  There is cerebral  atrophy.         DX-CHEST-PORTABLE (1 VIEW)   Final Result         1. Low lung volume with hypoventilatory change and bibasilar atelectasis.           LABORATORY  Lab Results   Component Value Date    SODIUM 138 03/07/2023    POTASSIUM 4.7 03/07/2023    CHLORIDE 107 03/07/2023    CO2 23 03/07/2023    GLUCOSE 83 03/07/2023    BUN 7 (L) 03/07/2023    CREATININE 0.53 03/07/2023        Lab Results   Component Value Date    WBC 7.9 03/07/2023    HEMOGLOBIN 11.5 (L) 03/07/2023    HEMATOCRIT 37.8 03/07/2023    PLATELETCT 269 03/07/2023        Total time of the discharge process exceeds 35 minutes.

## 2023-03-09 NOTE — CARE PLAN
The patient is Stable - Low risk of patient condition declining or worsening    Shift Goals  Clinical Goals: Manage O2, skin integrity, safety  Patient Goals: Rest, oral hydration  Family Goals: LIZETH    Progress made toward(s) clinical / shift goals:     Pt O2 sat above 90%. Pt required increase from 3L to 4L O2 NC to sat above 90%. Pt frequently pulls off nasal canula and is frequently re-educated. Bed alarm and strip alarm on. Pt does not call for help out of bed. Pt remains free of falls at this time. Pt has small red rash under left breast. New small pressure injury documented. Injury from PureWick suction tubing on left anterior upper leg. Pt found with no PureWick in place. PureWick suction tubing against pt skin at full suction. Wound cleaned and Bandaid applied.      Problem: Respiratory  Goal: Patient will achieve/maintain optimum respiratory ventilation and gas exchange  Outcome: Progressing     Problem: Fall Risk  Goal: Patient will remain free from falls  Outcome: Progressing     Problem: Skin Integrity  Goal: Skin integrity is maintained or improved  Outcome: Progressing

## 2023-03-09 NOTE — PROGRESS NOTES
Monitor Summary:    Rhythm:  SR   Rate Range:  78-80  Ectopy: R PVC, R TRIG    Measurements:  0.16/0.08/0.38

## 2023-03-09 NOTE — DISCHARGE PLANNING
Case Management Discharge Planning    Admission Date: 3/3/2023  GMLOS: 5.4  ALOS: 6    6-Clicks ADL Score: 15  6-Clicks Mobility Score: 13  PT and/or OT Eval ordered: Yes  Post-acute Referrals Ordered: Yes  Post-acute Choice Obtained: Yes  Has referral(s) been sent to post-acute provider:  Yes      Anticipated Discharge Dispo: SNF    DME Needed: No    Action(s) Taken: OTHER, CM RN spoke to Rohith from [facility] about bed availably today. Per Rohith unsure if bed is available and will reach back out to this CM RN with updates once [facility] has reviewed bed availability for the day.     0930: MARANDA RN reached back out to [facility], working on bed placement for pts. [facility] stating will call CM RN back once bed is available.     1030: CM RN received notification pt has bed at [facility]. T to  pt at [phone]. CM RN called Ap at [phone] to notify, no answer. CM RN called Christie to notify.     Escalations Completed: Provider    Medically Clear: Yes    Next Steps: CM RN to follow up with [facility] around 0930    Barriers to Discharge: Pending Placement    Is the patient up for discharge tomorrow: No

## 2023-03-09 NOTE — PROGRESS NOTES
Monitor summary:     Rhythm : SR  Rate : 81-96  Ectopy : Rare and Frequent PVC    MN=.16  QRS=.08  QT=.38        Per telemetry strip summary from monitor room.

## 2023-03-09 NOTE — DISCHARGE PLANNING
Agency/Facility Name: [facility]  Spoke To: Clara  Outcome: [facility] has a bed available today.  [facility] will set up transport and give this DPA a call back.      Agency/Facility Name: [facility]  Spoke To: Clara  Outcome: Transport is scheduled for [phone] via [facility].

## 2023-03-09 NOTE — PROGRESS NOTES
Received bedside report from KATE Christianson, pt care assumed. Pt 02 sat at 89% on 2L NC. Pt requires 4L to sat above 90% at this time. Pt had bowel movement with soft stool. Pt linens and nancy pad changed. Nancy care provided. No PureWick in place upon assessment. Suction tubing adhered to pt left upper leg at 100% suction. Small circular pressure injury at site of suction on leg. Photo taken and added to flowsheets. Wound cleaned and bandaged. Pt assessment complete. Pt A&Ox3, disoriented to situation. Pt c/o 0/10 pain at this time. POC discussed with pt and verbalizes no questions. Pt requested water. Water pitcher and cup provided. Bed locked and in lowest position, bed alarm on. Pt educated on fall risk and verbalized understanding, call light within reach, hourly rounding initiated.

## 2023-03-09 NOTE — PROGRESS NOTES
0700 - Received bedside report from KATE Reeder. Assessment completed. Pt A&O x 4. Respirations are even and unlabored on 2L nasal cannula. Pt denies pain at this time. Medical patient cleared for discharge today to [facility]. VS stable, call light and belongings within reach. Pt educated on room and call light, pt verbalized understanding. Communication board updated. Needs met.     1400 - Called [facility] and gave report to KATE Mott. Questions answered appropriately and encouraged her to call back with any further questions. Will perform final vitals prior to patient departure for transfer paperwork.

## 2023-03-09 NOTE — PROGRESS NOTES
Charge Nurse Rounding Note    Bedside rounding completed to address quality of care and overall patient experience.    Patient Satisfaction addressed including staff responsiveness. Patient/family are aware of the POC and any questions answered. Thorough safety education completed including use of call light prior to all mobility throughout the entirety of the hospital stay.     Patient/family aware of time of next Hourly Round.    No further questions/concerns currently.     Additional Notes: bed change performed, dressing applied to small skin tear/ pressure wound on left flank. Wound sustained form suction tubing sticking to skin on left flank.

## 2023-03-10 NOTE — PROGRESS NOTES
Signed patient AVS accidentally taken by family during [facility] transport. Unable to print secondary copy

## 2023-03-17 ENCOUNTER — APPOINTMENT (OUTPATIENT)
Dept: MEDICAL GROUP | Facility: MEDICAL CENTER | Age: 78
End: 2023-03-17
Payer: MEDICARE

## 2023-03-22 NOTE — DOCUMENTATION QUERY
"                                                                         Formerly [facility], [facility]                                                                       Query Response Note      PATIENT:               SD RODRIGUEZ  ACCT #:                  [id number]  MRN:                     [id number]  :                      1945  ADMIT DATE:       3/3/2023 4:34 PM  DISCH DATE:        3/9/2023 3:13 PM  RESPONDING  PROVIDER #:        [id number]           QUERY TEXT:    Encephalopathy is documented in the Medical Record. Please specify type.    NOTE:  If an appropriate response is not listed below, please respond with a new note.    The patient's Clinical Indicators include:  H&P: Encephalopathy \"likely metabolic, likely secondary to hypoxemia and hyponatremia.\"    Treatment & Monitoring: CT scan of the head did not show evidence for acute infarction or hemorrhage, Anticipate improvement with correcting/treating hypoxemia, hyponatremia .    Risk Factors: Acute hypoxic respiratory failure, SIRS, pulmonary embolism, metastatic disease.  Options provided:   -- Metabolic encephalopathy   -- Other type of encephalopathy, please specify   -- Unable to determine      Query created by: Patricia Chau on 3/22/2023 8:09 AM    RESPONSE TEXT:    Metabolic encephalopathy          Electronically signed by:  NELLY RILEY MD 3/22/2023 9:47 AM              "

## 2023-11-29 ENCOUNTER — PATIENT MESSAGE (OUTPATIENT)
Dept: HEALTH INFORMATION MANAGEMENT | Facility: OTHER | Age: 78
End: 2023-11-29